# Patient Record
Sex: FEMALE | HISPANIC OR LATINO | Employment: FULL TIME | URBAN - METROPOLITAN AREA
[De-identification: names, ages, dates, MRNs, and addresses within clinical notes are randomized per-mention and may not be internally consistent; named-entity substitution may affect disease eponyms.]

---

## 2017-02-09 ENCOUNTER — OFFICE VISIT (OUTPATIENT)
Dept: URGENT CARE | Facility: URGENT CARE | Age: 32
End: 2017-02-09

## 2017-02-09 VITALS
DIASTOLIC BLOOD PRESSURE: 69 MMHG | OXYGEN SATURATION: 99 % | WEIGHT: 127 LBS | HEART RATE: 114 BPM | TEMPERATURE: 100.7 F | SYSTOLIC BLOOD PRESSURE: 108 MMHG

## 2017-02-09 DIAGNOSIS — R50.9 FEVER AND CHILLS: Primary | ICD-10-CM

## 2017-02-09 DIAGNOSIS — J06.9 VIRAL URI WITH COUGH: ICD-10-CM

## 2017-02-09 LAB
DEPRECATED S PYO AG THROAT QL EIA: NORMAL
FLUAV+FLUBV AG SPEC QL: NORMAL
FLUAV+FLUBV AG SPEC QL: NORMAL
MICRO REPORT STATUS: NORMAL
SPECIMEN SOURCE: NORMAL
SPECIMEN SOURCE: NORMAL

## 2017-02-09 PROCEDURE — 87880 STREP A ASSAY W/OPTIC: CPT | Performed by: PHYSICIAN ASSISTANT

## 2017-02-09 PROCEDURE — 87804 INFLUENZA ASSAY W/OPTIC: CPT | Performed by: PHYSICIAN ASSISTANT

## 2017-02-09 PROCEDURE — 87081 CULTURE SCREEN ONLY: CPT | Performed by: PHYSICIAN ASSISTANT

## 2017-02-09 PROCEDURE — 99203 OFFICE O/P NEW LOW 30 MIN: CPT | Performed by: FAMILY MEDICINE

## 2017-02-09 RX ORDER — CODEINE PHOSPHATE AND GUAIFENESIN 10; 100 MG/5ML; MG/5ML
1 SOLUTION ORAL EVERY 4 HOURS PRN
Qty: 120 ML | Refills: 0 | Status: SHIPPED | OUTPATIENT
Start: 2017-02-09 | End: 2018-10-23

## 2017-02-09 NOTE — Clinical Note
[facility]   [address] [place] [address]   [phone]                February 9, 2017    RE:  Louisa Peck                                                                                                                                                       [address] [address]            To whom it may concern:    Louisa Peck is under my professional care for current illness  Louisa  Will not be able to be at work tomorrow 2/10/2017 for current illness     Sincerely,  Gena Choudhary MD

## 2017-02-10 NOTE — PROGRESS NOTES
Chief Complaint   Patient presents with     Headache     headache, nausea, fever, bodyache, difficulty breathing, sore throat and dizziness since yesterday.      Letter for School/Work     request work note.      SUBJECTIVE:   Louisa Peck is a 31 year old female who present complaining of flu-like symptoms: fevers, chills, myalgias, congestion, sore throat and cough for 1 days. Denies dyspnea or wheezing.  She also has associated headache with dry coughing   She denies any sob or any dizziness symptoms       10 point ROS of systems including , Eyes,Cardiovascular, Gastroenterology, Genitourinary, Integumentary, Muscularskeletal, Psychiatric were all negative except for pertinent positives noted in my HPI             OBJECTIVE:  Appears moderately ill but not toxic; temperature as noted in vitals. Ears normal. Throat and pharynx normal.  Neck supple. No adenopathy in the neck. Sinuses non tender. The chest is clear.    ASSESSMENT:  Louisa was seen today for headache and letter for school/work.    Diagnoses and all orders for this visit:    Fever and chills  -     Rapid strep screen  -     Influenza A/B antigen  -     Beta strep group A culture    Viral URI with cough  -     guaiFENesin-codeine (ROBITUSSIN AC) 100-10 MG/5ML SOLN solution; Take 5 mLs by mouth every 4 hours as needed for cough    flu and strep negative     PLAN:  Symptomatic therapy suggested: increase fluids, gargle prn for sore throat, apply heat to sinuses prn, use mist of vaporizer prn, OTC acetaminophen and call prn if symptoms persist or worsen. Call or return to clinic prn if these symptoms worsen or fail to improve as anticipated.  Was given note for work excusing her from work tomorrow

## 2017-02-10 NOTE — NURSING NOTE
Chief Complaint   Patient presents with     Headache     headache, nausea, fever, bodyache, difficulty breathing, sore throat and dizziness since yesterday.      Letter for School/Work     request work note.      Initial /69 mmHg  Pulse 114  Temp(Src) 100.7  F (38.2  C) (Oral)  Wt 127 lb (57.607 kg)  SpO2 99% There is no height on file to calculate BMI..  bp completed using cuff size sunitha Wallace MA

## 2017-02-11 LAB
BACTERIA SPEC CULT: NORMAL
MICRO REPORT STATUS: NORMAL
SPECIMEN SOURCE: NORMAL

## 2018-10-23 ENCOUNTER — OFFICE VISIT (OUTPATIENT)
Dept: OBGYN | Facility: CLINIC | Age: 33
End: 2018-10-23
Payer: COMMERCIAL

## 2018-10-23 VITALS
BODY MASS INDEX: 26.31 KG/M2 | SYSTOLIC BLOOD PRESSURE: 108 MMHG | DIASTOLIC BLOOD PRESSURE: 76 MMHG | WEIGHT: 134 LBS | HEIGHT: 60 IN

## 2018-10-23 DIAGNOSIS — Z13.220 LIPID SCREENING: ICD-10-CM

## 2018-10-23 DIAGNOSIS — N97.9 INFERTILITY, FEMALE: ICD-10-CM

## 2018-10-23 DIAGNOSIS — N64.4 BREAST PAIN, RIGHT: ICD-10-CM

## 2018-10-23 DIAGNOSIS — Z01.419 WELL WOMAN EXAM WITH ROUTINE GYNECOLOGICAL EXAM: Primary | ICD-10-CM

## 2018-10-23 DIAGNOSIS — Z11.3 ROUTINE SCREENING FOR STI (SEXUALLY TRANSMITTED INFECTION): ICD-10-CM

## 2018-10-23 DIAGNOSIS — Z11.51 SCREENING FOR HUMAN PAPILLOMAVIRUS: ICD-10-CM

## 2018-10-23 DIAGNOSIS — Z12.4 CERVICAL CANCER SCREENING: ICD-10-CM

## 2018-10-23 DIAGNOSIS — G43.109 MIGRAINE WITH AURA AND WITHOUT STATUS MIGRAINOSUS, NOT INTRACTABLE: ICD-10-CM

## 2018-10-23 DIAGNOSIS — Z13.1 SCREENING FOR DIABETES MELLITUS: ICD-10-CM

## 2018-10-23 DIAGNOSIS — R12 HEARTBURN: ICD-10-CM

## 2018-10-23 PROCEDURE — 99214 OFFICE O/P EST MOD 30 MIN: CPT | Mod: 25 | Performed by: ADVANCED PRACTICE MIDWIFE

## 2018-10-23 PROCEDURE — G0145 SCR C/V CYTO,THINLAYER,RESCR: HCPCS | Performed by: ADVANCED PRACTICE MIDWIFE

## 2018-10-23 PROCEDURE — 87624 HPV HI-RISK TYP POOLED RSLT: CPT | Performed by: ADVANCED PRACTICE MIDWIFE

## 2018-10-23 PROCEDURE — 99385 PREV VISIT NEW AGE 18-39: CPT | Performed by: ADVANCED PRACTICE MIDWIFE

## 2018-10-23 PROCEDURE — 87491 CHLMYD TRACH DNA AMP PROBE: CPT | Performed by: ADVANCED PRACTICE MIDWIFE

## 2018-10-23 PROCEDURE — 87591 N.GONORRHOEAE DNA AMP PROB: CPT | Performed by: ADVANCED PRACTICE MIDWIFE

## 2018-10-23 NOTE — LETTER
November 1, 2018    Louisa Peck  [address]  [facility] [address]    Dear Louisa,  We are happy to inform you that your PAP smear result from 10/23/18 is normal.  We are now able to do a follow up test on PAP smears. The DNA test is for HPV (Human Papilloma Virus). Cervical cancer is closely linked with certain types of HPV. Your results showed no evidence of high risk HPV.  Therefore we recommend you return in 5 years for your next pap smear and HPV test.  You will still need to return to the clinic every year for an annual exam and other preventive tests.  If you have additional questions regarding this result, please call our registered nurse, Cindy at [phone].  Sincerely,    Bianca Omer CNM/hunter

## 2018-10-23 NOTE — MR AVS SNAPSHOT
After Visit Summary   10/23/2018    Louisa Peck    MRN: [id number]           Patient Information     Date Of Birth          1985        Visit Information        Provider Department      10/23/2018 3:00 PM Bianca Omer CNM [facility]        Today's Diagnoses     Infertility, female    -  1    Migraine with aura and without status migrainosus, not intractable        Heartburn        Routine screening for STI (sexually transmitted infection)        Screening for human papillomavirus        Cervical cancer screening        Lipid screening        Screening for diabetes mellitus          Care Instructions    Preconception Care    If you are thinking about becoming pregnant in the near future or actively trying to conceive we want to make sure you are as healthy as possible before becoming pregnant. By meeting with your midwife or provider prior to getting pregnant it allows us to address any health needs that can affect pregnancy. Please discuss your personal and family history with your midwife in order for us to identify any risk factors        Make sure all the medications you are taking are safe for pregnancy. This is especially important for women with chronic health conditions such as diabetes, seizure disorders, and/or hypertension. If you are unsure if your medications are safe we can discuss them with you, do not abruptly stop taking something if you've been prescribed a medication by a medical provider      Folic acid 400-800 mcg per day by mouth daily, begin this routine 1 to 12 months prior to planned conception, and continue through at least 13 weeks gestation. Some prenatal/multi-vitamins contain enough folic acid       Be up to date on all your vaccines. Avoid pregnancy for 1 month after administration of varicella/ Rubella (MMR) vaccines      We encourage all women to be at healthy BMI (<26) when attempting pregnancy, it is healthier for  both you and your baby. If you are overweight you can make a healthy choice by eating better and exercising more. Waiting to get pregnant at a healthy weight is an excellent choice.                                       Eat a healthy, well-balanced diet containing lots of fresh fruit and vegetables (frozen without added sugar is okay), protein, and healthy carbohydrates such as whole wheat breads and pastas      Exercise regularly. If you are wishing to get pregnant maintain normal exercise routine. If you don't exercise this is a great time for light activity daily such as walking/swimming      Limit caffeine intake to less than 200 mg per day, typically 1-2 cups of regular coffee is about 200 mg.       Avoid cigarettes, alcohol, and recreational drug use while attempting pregnancy. If you are actively trying to conceive but you get your period or a negative pregnancy test it is okay to have alcohol in moderation until you are actively trying again      If you have the potential to toxic chemical exposures in your work place or home please use special precautions                     Follow-ups after your visit        Additional Services     INFERTILITY/AI REFERRAL       Your provider has referred you to: [facility] [phone]   [email or web address]    Please be aware that coverage of these services is subject to the terms and limitations of your health insurance plan.  Call member services at your health plan with any benefit or coverage questions.      Please bring the following with you to your appointment:    (1) Any X-Rays, CTs or MRIs which have been performed.  Contact the facility where they were done to arrange for  prior to your scheduled appointment.    (2) List of current medications   (3) This referral request   (4) Any documents/labs given to you for this referral            INTERNAL MEDICINE REFERRAL       Your provider has referred you to: [facility]: Internal  "Medicine - Multiple locations [phone] [email or web address]    Please be aware that coverage of these services is subject to the terms and limitations of your health insurance plan.  Call member services at your health plan with any benefit or coverage questions.      Please bring the following to your appointment:  >>   Any x-rays, CTs or MRIs which have been performed.  Contact the facility where they were done to arrange for  prior to your scheduled appointment.   >>   List of current medications   >>   This referral request   >>   Any documents/labs given to you for this referral                  Future tests that were ordered for you today     Open Future Orders        Priority Expected Expires Ordered    Lipid Profile (Chol, Trig, HDL, LDL calc) Routine  10/23/2019 10/23/2018    Glucose Routine  10/23/2019 10/23/2018            Who to contact     If you have questions or need follow up information about today's clinic visit or your schedule please contact [facility] directly at [phone].  Normal or non-critical lab and imaging results will be communicated to you by MyChart, letter or phone within 4 business days after the clinic has received the results. If you do not hear from us within 7 days, please contact the clinic through [employer] or phone. If you have a critical or abnormal lab result, we will notify you by phone as soon as possible.  Submit refill requests through [email or web address] or call your pharmacy and they will forward the refill request to us. Please allow 3 business days for your refill to be completed.          Additional Information About Your Visit        [employer] Information     [email or web address] lets you send messages to your doctor, view your test results, renew your prescriptions, schedule appointments and more. To sign up, go to [email or web address] . Click on \"Log in\" on the left side of the screen, which will take you to " "the Welcome page. Then click on \"Sign up Now\" on the right side of the page.     You will be asked to enter the access code listed below, as well as some personal information. Please follow the directions to create your username and password.     Your access code is: [email or web address]  Expires: 2019  3:59 PM     Your access code will  in 90 days. If you need help or a new code, please call your [facility] clinic or [phone].        Care EveryWhere ID     This is your Care EveryWhere ID. This could be used by other organizations to access your [facility] medical records  [id number]        Your Vitals Were     Height Last Period Breastfeeding? BMI (Body Mass Index)          5' (1.524 m) 10/16/2018 (Exact Date) No 26.17 kg/m2         Blood Pressure from Last 3 Encounters:   10/23/18 108/76   17 108/69    Weight from Last 3 Encounters:   10/23/18 134 lb (60.8 kg)   17 127 lb (57.6 kg)              We Performed the Following     Chlamydia trachomatis PCR     HPV High Risk Types DNA Cervical     INFERTILITY/AI REFERRAL     INTERNAL MEDICINE REFERRAL     Neisseria gonorrhoeae PCR     PAP imaged thin layer screen        Primary Care Provider Office Phone # Fax #    Bianca Rogers [place], [facility] [phone]       [address] [facility] [address]        Equal Access to Services     [facility]: Hadii aad ku hadasho Soomaali, waaxda luqadaha, qaybta kaalmada adeegyada, amando godoy hayjean marie lima . So [facility] [phone].    ATENCIÓN: Si habla español, tiene a alcaraz disposición servicios gratuitos de asistencia lingüística. Llame al [phone].    We comply with applicable federal civil rights laws and [place] laws. We do not discriminate on the basis of race, color, national origin, age, disability, sex, sexual orientation, or gender identity.            Thank you!     Thank you for choosing [facility]  for your care. Our goal is always to provide " you with excellent care. Hearing back from our patients is one way we can continue to improve our services. Please take a few minutes to complete the written survey that you may receive in the mail after your visit with us. Thank you!             Your Updated Medication List - Protect others around you: Learn how to safely use, store and throw away your medicines at www.disposemymeds.org.      Notice  As of 10/23/2018  3:59 PM    You have not been prescribed any medications.

## 2018-10-23 NOTE — PATIENT INSTRUCTIONS
Preconception Care    If you are thinking about becoming pregnant in the near future or actively trying to conceive we want to make sure you are as healthy as possible before becoming pregnant. By meeting with your midwife or provider prior to getting pregnant it allows us to address any health needs that can affect pregnancy. Please discuss your personal and family history with your midwife in order for us to identify any risk factors        Make sure all the medications you are taking are safe for pregnancy. This is especially important for women with chronic health conditions such as diabetes, seizure disorders, and/or hypertension. If you are unsure if your medications are safe we can discuss them with you, do not abruptly stop taking something if you've been prescribed a medication by a medical provider      Folic acid 400-800 mcg per day by mouth daily, begin this routine 1 to 12 months prior to planned conception, and continue through at least 13 weeks gestation. Some prenatal/multi-vitamins contain enough folic acid       Be up to date on all your vaccines. Avoid pregnancy for 1 month after administration of varicella/ Rubella (MMR) vaccines      We encourage all women to be at healthy BMI (<26) when attempting pregnancy, it is healthier for both you and your baby. If you are overweight you can make a healthy choice by eating better and exercising more. Waiting to get pregnant at a healthy weight is an excellent choice.                                       Eat a healthy, well-balanced diet containing lots of fresh fruit and vegetables (frozen without added sugar is okay), protein, and healthy carbohydrates such as whole wheat breads and pastas      Exercise regularly. If you are wishing to get pregnant maintain normal exercise routine. If you don't exercise this is a great time for light activity daily such as walking/swimming      Limit caffeine intake to less than 200 mg per day, typically 1-2 cups of  regular coffee is about 200 mg.       Avoid cigarettes, alcohol, and recreational drug use while attempting pregnancy. If you are actively trying to conceive but you get your period or a negative pregnancy test it is okay to have alcohol in moderation until you are actively trying again      If you have the potential to toxic chemical exposures in your work place or home please use special precautions

## 2018-10-23 NOTE — PROGRESS NOTES
"Louisa is a 33 year old  female who presents for annual exam.     Besides routine health maintenance,  she would like to discuss heavy periods with cramps and migraines. She would also like to discuss a history of gastritis and heartburn which has not been evaluated for many years. She also reports breast pain. Lastly she would like to discuss becoming pregnant in the next year.  HPI:    Periods & Migraines: Patient reports for several years she has been having problems with periods. States she gets a headache 5 days prior to period, it lasts through the period, and for 2 days after. States she does sometime have flashing lights and vision changes, possible aura. States she tries Tylenol and ibuprofen but it doesn't help much. Periods are also very heavy and she has strong cramping for the first 2 days.   Heartburn: States she has a long term history of heartburn and \"gastritis\" which hasn't been evaluated for a long time. She was told by a provider at one point that she should not take ibuprofen. She is not using anything for the heartburn.   Breast pain: Patient reports right breast pain x 1 year. She has not noticed any change in appearance, no lumps/masses, no discharge. No left breast pain.   Preconception: Patient reports she is  to a woman and is planning to conceive within the next year. She is requesting information and a referral to help with IUI. Patient is planning to use donor sperm and she will be carrying the pregnancy. Has not started any prenatal vitamins yet.   Menses are regular q 28-30 days and crampy and heavy lasting 7 days.   Menses flow: normal and heavy with clots on the first 2 days.    Patient's last menstrual period was 10/16/2018 (exact date)..   Does not need contraception  She is currently considering pregnancy.    REPRODUCTIVE/GYNECOLOGIC HISTORY:  Louisa is sexually active with female partner(s) and is currently in monogamous relationship.   STI testing offered?  " Accepted  History of abnormal Pap smear:  No  SOCIAL HISTORY  Abuse: does not report having previously been physical or sexually abused.    Do you feel safe in your environment? YES    She  reports that she has never smoked. She has never used smokeless tobacco.          HEALTH MAINTENANCE:  Cholesterol: (No results found for: CHOL History of abnormal lipids: No  Mammo: N/A . History of abnormal Mammo: Not applicable.  Regular Self Breast Exams: Yes  TSH: (No results found for: TSH )  Pap; (No results found for: PAP )  Immunizations up to date: yes  (Gardasil, Tdap, Flu)  Health maintenance updated:  no    HEALTHY HABITS  Eating habits: eats regular meals  Calcium/Vitamin D intake: source:  dairy Adequate?   Exercise: How often do you exercise? Daily;Walking  Have you had an eye exam in the last two years? NO (Recommended)  Do you routinely see the dentist once or twice yearly? NO (Recommended)      HISTORY:  Obstetric History       T0      L0     SAB0   TAB0   Ectopic0   Multiple0   Live Births0         History reviewed. No pertinent past medical history.  Past Surgical History:   Procedure Laterality Date     ARTHROSCOPY KNEE WITH MEDIAL MENISCECTOMY Left      REMV/REVISN BOOT/BODY CAST Left      Family History   Problem Relation Age of Onset     Diabetes Mother      Diabetes Father      Social History     Social History     Marital status:      Spouse name: N/A     Number of children: N/A     Years of education: N/A     Social History Main Topics     Smoking status: Never Smoker     Smokeless tobacco: Never Used     Alcohol use 0.0 oz/week     0 Standard drinks or equivalent per week      Comment: Rare     Drug use: No     Sexual activity: Yes     Partners: Female     Other Topics Concern     None     Social History Narrative     No current outpatient prescriptions on file.   No Known Allergies    Past medical, surgical, social and family history were reviewed and updated in EPIC.    ROS:    12 point review of systems negative other than symptoms noted below.  Gastrointestinal: Heartburn    PHYSICAL EXAM:  /76 (BP Location: Left arm, Patient Position: Sitting, Cuff Size: Adult Regular)  Ht 5' (1.524 m)  Wt 134 lb (60.8 kg)  LMP 10/16/2018 (Exact Date)  Breastfeeding? No  BMI 26.17 kg/m2   BMI: Body mass index is 26.17 kg/(m^2).  Constitutional: healthy, alert and no distress  Neck: symmetrical, thyroid normal size, no masses present, no lymphadenopathy present.   Cardiovascular: RRR, no murmurs present  Respiratory: breathing unlabored, lungs CTA bilaterally  Breast: Right breast 2 cm x 2 cm firm nodule palpated below the clavicle, non-tender, movable. No other masses, discharge, or skin changes noted.   Left normal without masses, tenderness or nipple discharge and no palpable axillary masses or adenopathy  Gastrointestinal: abdomen soft, non-tender, bowel sounds present  PELVIC EXAM:  Vulva: No lesions, no adenopathy, BUS WNL  Vagina: Moist, pink, discharge normal  well rugated, no lesions  Cervix:smooth, pink, no visible lesions  Uterus: Normal size, non-tender, mobile  Ovaries: No masses palpated  Rectal exam: deferred    ASSESSMENT/PLAN:    ICD-10-CM    1. Well woman exam with routine gynecological exam Z01.419    2. Infertility, female N97.9 INFERTILITY/AI REFERRAL   3. Migraine with aura and without status migrainosus, not intractable G43.109 INTERNAL MEDICINE REFERRAL   4. Heartburn R12 INTERNAL MEDICINE REFERRAL   5. Routine screening for STI (sexually transmitted infection) Z11.3 Neisseria gonorrhoeae PCR     Chlamydia trachomatis PCR   6. Screening for human papillomavirus Z11.51 HPV High Risk Types DNA Cervical   7. Cervical cancer screening Z12.4 CANCELED: PAP imaged thin layer screen   8. Lipid screening Z13.220 Lipid Profile (Chol, Trig, HDL, LDL calc)   9. Screening for diabetes mellitus Z13.1 Glucose   10. Breast pain, right N64.4 MA Diagnostic Digital Bilateral      Discussed options to control periods including progesterone only contraceptive options. Patient not a candidate for estrogen due to migraines with aura. Patient planning to conceive and declines POPs. Referred to internal medicine for evaluation and treatment of migraines. Discussed option of NSAIDs to control bleeding and cramping but due to GI history, recommend internal medicine evaluation prior to beginning NSAID treatment.     Recommended Tums over the counter for heartburn and referral to IM for full evaluation of GI symptoms    Pap and HPV, if negative plan 5 year co-testing    Labs pending, will follow up with results    Referral to infertility made, for evaluation and possible IUI    Discussed preconception-  Including folic acid, vaccines up to date, healthy diet, and exercise.     Diagnostic mammogram and ultrasound of the right breast ordered. Follow up with results      COUNSELING:   Reviewed preventive health counseling, as reflected in patient instructions  Special attention given to:        Regular exercise       Healthy diet/nutrition       Vision screening       Hearing screening       Family planning   reports that she has never smoked. She has never used smokeless tobacco.      Bianca Omer CNM, ELOINA-BC

## 2018-10-23 NOTE — NURSING NOTE
Chief Complaint   Patient presents with     Gyn Exam     Annual with pap      Abnormal Bleeding Problem     Heavy periods        Initial /76 (BP Location: Left arm, Patient Position: Sitting, Cuff Size: Adult Regular)  Ht 5' (1.524 m)  Wt 134 lb (60.8 kg)  LMP 10/16/2018 (Exact Date)  Breastfeeding? No  BMI 26.17 kg/m2 Estimated body mass index is 26.17 kg/(m^2) as calculated from the following:    Height as of this encounter: 5' (1.524 m).    Weight as of this encounter: 134 lb (60.8 kg).  BP completed using cuff size: regular    Questioned patient about current smoking habits.  Pt. has never smoked.          The following HM Due: pap smear    patient has appointment for today.  Ousmane Brown CMA

## 2018-10-25 LAB
C TRACH DNA SPEC QL NAA+PROBE: NEGATIVE
COPATH REPORT: NORMAL
N GONORRHOEA DNA SPEC QL NAA+PROBE: NEGATIVE
PAP: NORMAL
SPECIMEN SOURCE: NORMAL
SPECIMEN SOURCE: NORMAL

## 2018-10-26 LAB
FINAL DIAGNOSIS: NORMAL
HPV HR 12 DNA CVX QL NAA+PROBE: NEGATIVE
HPV16 DNA SPEC QL NAA+PROBE: NEGATIVE
HPV18 DNA SPEC QL NAA+PROBE: NEGATIVE
SPECIMEN DESCRIPTION: NORMAL
SPECIMEN SOURCE CVX/VAG CYTO: NORMAL

## 2019-05-09 ENCOUNTER — OFFICE VISIT (OUTPATIENT)
Dept: URGENT CARE | Facility: URGENT CARE | Age: 34
End: 2019-05-09
Payer: COMMERCIAL

## 2019-05-09 VITALS
OXYGEN SATURATION: 99 % | SYSTOLIC BLOOD PRESSURE: 104 MMHG | TEMPERATURE: 98.4 F | HEART RATE: 94 BPM | BODY MASS INDEX: 26.83 KG/M2 | WEIGHT: 137.4 LBS | DIASTOLIC BLOOD PRESSURE: 64 MMHG

## 2019-05-09 DIAGNOSIS — D64.9 ANEMIA, UNSPECIFIED TYPE: ICD-10-CM

## 2019-05-09 DIAGNOSIS — E07.89 PAINFUL THYROID: ICD-10-CM

## 2019-05-09 DIAGNOSIS — M54.2 NECK PAIN: Primary | ICD-10-CM

## 2019-05-09 LAB
BASOPHILS # BLD AUTO: 0 10E9/L (ref 0–0.2)
BASOPHILS NFR BLD AUTO: 0.4 %
DIFFERENTIAL METHOD BLD: ABNORMAL
EOSINOPHIL # BLD AUTO: 0.2 10E9/L (ref 0–0.7)
EOSINOPHIL NFR BLD AUTO: 1.6 %
ERYTHROCYTE [DISTWIDTH] IN BLOOD BY AUTOMATED COUNT: 14.8 % (ref 10–15)
ERYTHROCYTE [SEDIMENTATION RATE] IN BLOOD BY WESTERGREN METHOD: 25 MM/H (ref 0–20)
HCT VFR BLD AUTO: 34 % (ref 35–47)
HGB BLD-MCNC: 11.1 G/DL (ref 11.7–15.7)
LYMPHOCYTES # BLD AUTO: 3 10E9/L (ref 0.8–5.3)
LYMPHOCYTES NFR BLD AUTO: 29.7 %
MCH RBC QN AUTO: 25.9 PG (ref 26.5–33)
MCHC RBC AUTO-ENTMCNC: 32.6 G/DL (ref 31.5–36.5)
MCV RBC AUTO: 79 FL (ref 78–100)
MONOCYTES # BLD AUTO: 1.1 10E9/L (ref 0–1.3)
MONOCYTES NFR BLD AUTO: 10.8 %
NEUTROPHILS # BLD AUTO: 5.8 10E9/L (ref 1.6–8.3)
NEUTROPHILS NFR BLD AUTO: 57.5 %
PLATELET # BLD AUTO: 248 10E9/L (ref 150–450)
RBC # BLD AUTO: 4.29 10E12/L (ref 3.8–5.2)
TSH SERPL DL<=0.005 MIU/L-ACNC: 1.58 MU/L (ref 0.4–4)
WBC # BLD AUTO: 10.2 10E9/L (ref 4–11)

## 2019-05-09 PROCEDURE — 85652 RBC SED RATE AUTOMATED: CPT | Performed by: PHYSICIAN ASSISTANT

## 2019-05-09 PROCEDURE — 85025 COMPLETE CBC W/AUTO DIFF WBC: CPT | Performed by: PHYSICIAN ASSISTANT

## 2019-05-09 PROCEDURE — 36415 COLL VENOUS BLD VENIPUNCTURE: CPT | Performed by: PHYSICIAN ASSISTANT

## 2019-05-09 PROCEDURE — 84443 ASSAY THYROID STIM HORMONE: CPT | Performed by: PHYSICIAN ASSISTANT

## 2019-05-09 PROCEDURE — 99214 OFFICE O/P EST MOD 30 MIN: CPT | Performed by: PHYSICIAN ASSISTANT

## 2019-05-09 RX ORDER — IBUPROFEN 800 MG/1
800 TABLET, FILM COATED ORAL EVERY 8 HOURS PRN
Qty: 30 TABLET | Refills: 0 | Status: SHIPPED | OUTPATIENT
Start: 2019-05-09 | End: 2019-06-13

## 2019-05-09 RX ORDER — CEPHALEXIN 500 MG/1
500 CAPSULE ORAL 3 TIMES DAILY
Qty: 30 CAPSULE | Refills: 0 | Status: SHIPPED | OUTPATIENT
Start: 2019-05-09 | End: 2019-05-10

## 2019-05-10 ENCOUNTER — OFFICE VISIT (OUTPATIENT)
Dept: INTERNAL MEDICINE | Facility: CLINIC | Age: 34
End: 2019-05-10
Payer: COMMERCIAL

## 2019-05-10 VITALS
TEMPERATURE: 98.3 F | WEIGHT: 140 LBS | HEART RATE: 99 BPM | SYSTOLIC BLOOD PRESSURE: 100 MMHG | OXYGEN SATURATION: 100 % | BODY MASS INDEX: 27.34 KG/M2 | DIASTOLIC BLOOD PRESSURE: 70 MMHG | RESPIRATION RATE: 16 BRPM

## 2019-05-10 DIAGNOSIS — E07.9 SWELLING OF THYROID GLAND: ICD-10-CM

## 2019-05-10 DIAGNOSIS — E07.89 PAINFUL THYROID: Primary | ICD-10-CM

## 2019-05-10 PROCEDURE — 99214 OFFICE O/P EST MOD 30 MIN: CPT | Performed by: PHYSICIAN ASSISTANT

## 2019-05-10 NOTE — PROGRESS NOTES
SUBJECTIVE:  Louisa Peck is a 33 year old female with a chief complaint of sore throat.  Onset of symptoms was 3 day(s) ago.    Course of illness: gradual onset, still present and worsening.  Severity moderate  Current and Associated symptoms: left thyroid tenderness, localized swelling  Treatment measures tried include neck pain, swelling.  Predisposing factors include none.    PMH  Overweight    ALLERGIES  No Known Allergies     Social History     Tobacco Use     Smoking status: Never Smoker     Smokeless tobacco: Never Used   Substance Use Topics     Alcohol use: Yes     Alcohol/week: 0.0 oz     Comment: Rare     Family History  Obesity     ROS:  CONSTITUTIONAL:NEGATIVE for fever, chills, change in weight  INTEGUMENTARY/SKIN: POSITIVE for mild swelling left side throat, neck  EYES: NEGATIVE for vision changes or irritation  ENT/MOUTH: POSITIVE for left side lower neck tenderness, swelling and pain  RESP:NEGATIVE for significant cough or SOB  CV: NEGATIVE for chest pain, palpitations or peripheral edema  GI: NEGATIVE for nausea, abdominal pain, heartburn, or change in bowel habits  : negative for pregnancy  MUSCULOSKELETAL: NEGATIVE for significant arthralgias or myalgia  NEURO: NEGATIVE for weakness, dizziness or paresthesias    OBJECTIVE:   /64   Pulse 94   Temp 98.4  F (36.9  C) (Oral)   Wt 62.3 kg (137 lb 6.4 oz)   SpO2 99%   Breastfeeding? No   BMI 26.83 kg/m    GENERAL APPEARANCE: healthy, alert and no distress  HENT: ear canals and TM's normal.  Nose normal.  Pharynx erythematous with some exudate noted.  NECK: Positive for tender, swelling nodular area off left side neck  RESP: lungs clear to auscultation - no rales, rhonchi or wheezes  CV: regular rates and rhythm, normal S1 S2, no murmur noted  ABDOMEN:  soft, nontender, no HSM or masses and bowel sounds normal  SKIN: Positive for mild swelling , localized swelling left side neck    Results for orders placed or performed in  visit on 05/09/19   CBC with platelets differential   Result Value Ref Range    WBC 10.2 4.0 - 11.0 10e9/L    RBC Count 4.29 3.8 - 5.2 10e12/L    Hemoglobin 11.1 (L) 11.7 - 15.7 g/dL    Hematocrit 34.0 (L) 35.0 - 47.0 %    MCV 79 78 - 100 fl    MCH 25.9 (L) 26.5 - 33.0 pg    MCHC 32.6 31.5 - 36.5 g/dL    RDW 14.8 10.0 - 15.0 %    Platelet Count 248 150 - 450 10e9/L    % Neutrophils 57.5 %    % Lymphocytes 29.7 %    % Monocytes 10.8 %    % Eosinophils 1.6 %    % Basophils 0.4 %    Absolute Neutrophil 5.8 1.6 - 8.3 10e9/L    Absolute Lymphocytes 3.0 0.8 - 5.3 10e9/L    Absolute Monocytes 1.1 0.0 - 1.3 10e9/L    Absolute Eosinophils 0.2 0.0 - 0.7 10e9/L    Absolute Basophils 0.0 0.0 - 0.2 10e9/L    Diff Method Automated Method    TSH with free T4 reflex   Result Value Ref Range    TSH 1.58 0.40 - 4.00 mU/L   Erythrocyte sedimentation rate auto   Result Value Ref Range    Sed Rate 25 (H) 0 - 20 mm/h         ASSESSMENT/PLAN      ICD-10-CM    1. Neck pain M54.2 CBC with platelets differential     TSH with free T4 reflex     Erythrocyte sedimentation rate auto     ibuprofen (ADVIL/MOTRIN) 800 MG tablet   2. Painful thyroid E07.89 CBC with platelets differential     TSH with free T4 reflex     Erythrocyte sedimentation rate auto     ibuprofen (ADVIL/MOTRIN) 800 MG tablet     cephALEXin (KEFLEX) 500 MG capsule   3. Anemia, unspecified type D64.9           Orders Placed This Encounter     CBC with platelets differential     TSH with free T4 reflex     Erythrocyte sedimentation rate auto     ibuprofen (ADVIL/MOTRIN) 800 MG tablet     cephALEXin (KEFLEX) 500 MG capsule       Motrin for swelling and tenderness  Keflex to cover for any secondary infections, although unlikely  Advised to follow up with PCP tomorrow for evaluation, she may need ultrasound of neck, possible needle biopsy  Advised to go to the ED if symptoms worsen

## 2019-05-10 NOTE — PROGRESS NOTES
SUBJECTIVE:   Louisa Peck is a 33 year old female who presents to clinic today for the following   health issues:        ED/UC Followup:    Facility:  [facility]  Date of visit: 5/9/19  Reason for visit: Mass on neck  Current Status: Pt states that it is still painful and hurts when swallowing     Seen in UC yesterday dx with thyroid gland pain/swelling thyroiditis, treatment with Motrin which helps the pain and swelling.  Also started on keflex ?   Labs done normal TSH no elevated WBC count and Sed rate 25. - not markedly elevated  No person or family hx of thyroid disorder.          -------------------------------------    Additional history: as documented    Reviewed  and updated as needed this visit by clinical staff         Reviewed and updated as needed this visit by Provider  Allergies  Meds         Labs reviewed in EPIC    ROS:  Constitutional, HEENT, cardiovascular, pulmonary, gi and gu systems are negative, except as otherwise noted.    OBJECTIVE:     /70 (BP Location: Left arm, Patient Position: Chair, Cuff Size: Adult Regular)   Pulse 99   Temp 98.3  F (36.8  C) (Oral)   Resp 16   Wt 63.5 kg (140 lb)   LMP 05/10/2019   SpO2 100%   BMI 27.34 kg/m    Body mass index is 27.34 kg/m .  GENERAL: healthy, alert and no distress  HENT: normal cephalic/atraumatic, ear canals and TM's normal, nose and mouth without ulcers or lesions, oropharynx clear and oral mucous membranes moist  NECK: no adenopathy and thyroid swelling on the left noted, tender  CV: regular rate and rhythm, normal S1 S2, no S3 or S4, no murmur, click or rub, no peripheral edema and peripheral pulses strong  MS: no gross musculoskeletal defects noted, no edema  SKIN: no suspicious lesions or rashes    Diagnostic Test Results:  Pending     ASSESSMENT/PLAN:             1. Painful thyroid    - US Thyroid; Future    2. Swelling of thyroid gland    - US Thyroid; Future    Patient Instructions   Icing,  Continue the  Motrin  Ok to stop keflex     Ultrasound next week  Then follow up with new primary provider.        Bonny Aguilar PA-C  [facility]

## 2019-05-10 NOTE — PATIENT INSTRUCTIONS
Icing,  Continue the Motrin  Ok to stop keflex     Ultrasound next week  Then follow up with new primary provider.

## 2019-05-14 ENCOUNTER — HOSPITAL ENCOUNTER (OUTPATIENT)
Dept: MAMMOGRAPHY | Facility: CLINIC | Age: 34
Discharge: HOME | End: 2019-05-14
Attending: ADVANCED PRACTICE MIDWIFE | Admitting: ADVANCED PRACTICE MIDWIFE
Payer: COMMERCIAL

## 2019-05-14 ENCOUNTER — HOSPITAL ENCOUNTER (OUTPATIENT)
Dept: ULTRASOUND IMAGING | Facility: CLINIC | Age: 34
End: 2019-05-14
Attending: PHYSICIAN ASSISTANT
Payer: COMMERCIAL

## 2019-05-14 ENCOUNTER — HOSPITAL ENCOUNTER (OUTPATIENT)
Dept: MAMMOGRAPHY | Facility: CLINIC | Age: 34
End: 2019-05-14
Attending: ADVANCED PRACTICE MIDWIFE
Payer: COMMERCIAL

## 2019-05-14 DIAGNOSIS — N64.4 BREAST PAIN, RIGHT: ICD-10-CM

## 2019-05-14 DIAGNOSIS — E07.9 SWELLING OF THYROID GLAND: ICD-10-CM

## 2019-05-14 DIAGNOSIS — E07.89 PAINFUL THYROID: ICD-10-CM

## 2019-05-14 PROCEDURE — 77066 DX MAMMO INCL CAD BI: CPT

## 2019-05-14 PROCEDURE — 76642 ULTRASOUND BREAST LIMITED: CPT | Mod: 50

## 2019-05-14 PROCEDURE — 76536 US EXAM OF HEAD AND NECK: CPT

## 2019-05-14 PROCEDURE — G0279 TOMOSYNTHESIS, MAMMO: HCPCS

## 2019-05-15 ENCOUNTER — TELEPHONE (OUTPATIENT)
Dept: INTERNAL MEDICINE | Facility: CLINIC | Age: 34
End: 2019-05-15

## 2019-05-15 DIAGNOSIS — E04.1 THYROID CYST: ICD-10-CM

## 2019-05-15 DIAGNOSIS — N60.01 BREAST CYST, RIGHT: Primary | ICD-10-CM

## 2019-05-15 DIAGNOSIS — E04.1 THYROID NODULE: Primary | ICD-10-CM

## 2019-05-15 NOTE — TELEPHONE ENCOUNTER
Patient informed of message below and given referral information.  Christina Harris, CMA on 5/15/2019 at 10:26 AM

## 2019-05-15 NOTE — TELEPHONE ENCOUNTER
Ultrasound of the thyroid shows a cyst and a nodule on the left side.  Recommendation to see endocrinology for further management of this. - Possible biopsy .

## 2019-05-20 ENCOUNTER — OFFICE VISIT (OUTPATIENT)
Dept: INTERNAL MEDICINE | Facility: CLINIC | Age: 34
End: 2019-05-20
Payer: COMMERCIAL

## 2019-05-20 VITALS
RESPIRATION RATE: 16 BRPM | HEART RATE: 84 BPM | WEIGHT: 138 LBS | BODY MASS INDEX: 27.09 KG/M2 | DIASTOLIC BLOOD PRESSURE: 68 MMHG | SYSTOLIC BLOOD PRESSURE: 108 MMHG | TEMPERATURE: 98.9 F | HEIGHT: 60 IN | OXYGEN SATURATION: 100 %

## 2019-05-20 DIAGNOSIS — J31.0 RHINITIS, UNSPECIFIED TYPE: ICD-10-CM

## 2019-05-20 DIAGNOSIS — E04.1 THYROID NODULE: Primary | ICD-10-CM

## 2019-05-20 PROCEDURE — 99214 OFFICE O/P EST MOD 30 MIN: CPT | Performed by: INTERNAL MEDICINE

## 2019-05-20 ASSESSMENT — MIFFLIN-ST. JEOR: SCORE: 1242.46

## 2019-05-20 NOTE — PROGRESS NOTES
"Subjective     Louisa Peck is a 33 year old female who presents to clinic today for the following health issues:    HPI   Chief Complaint   Patient presents with     Establish Care     Was under th care of Dr. Bianca Omer in [place]     Follow Up     for O.V with Bonny Aguilar on the 5/10/2019      Results     for U/S thyroid on 05/14/2019       Pt's past medical history, family history, habits, medications and allergies were reviewed with the patient today.   Most recent lab results reviewed with pt. Problem list and histories reviewed & adjusted, as indicated.  Additional history as below:    Meeting patient for the first time today.  Recently found to have a left thyroid nodule and underwent a thyroid ultrasound showing a 3.8cm nodule as below:\"    ULTRASOUND THYROID     PROCEDURE DATE:  5/14/2019 5:02 PM      COMPARISON:   None.     HISTORY:   33-year-old female with painful swollen thyroid gland.     TECHNIQUE:  Grayscale and color doppler ultrasound of the thyroid gland was  performed     FINDINGS:   The right lobe measures 5.1 x 1.5 x 1.9 cm. The left lobe measures 5.2  x 1.6 x 3.0 cm. The isthmus is normal in thickness. Thyroid parenchyma  is homogenous in echotexture.     Thyroid nodules as follows:   1.  Within the inferior left lobe a 3.8 x 3.4 x 2.7 cm complex cystic  and solid nodule is present. No internal calcifications.                                                                    IMPRESSION:   3.8 cm left inferior lobe nodule. Fine-needle aspiration could be  performed..     MARY ANN SERRA MD       Component      Latest Ref Rng & Units 5/9/2019   TSH      0.40 - 4.00 mU/L 1.58       Patient has some clear postnasal drainage occasionally which causes a nonproductive cough.  Mild sensation of swallowing difficulties at times with large thyroid nodule.  Denies any stridor.  No shortness of breath.  No neck pain.  Thyroid function normal as above.  Review the chart shows patient has " an appointment with [place] [facility]  in mid June    Additional ROS:   Constitutional, HEENT, Cardiovascular, Pulmonary, GI and , Neuro, MSK and Psych review of systems/symptoms are otherwise negative or unchanged from previous, except as noted above.      OBJECTIVE:  /68   Pulse 84   Temp 98.9  F (37.2  C) (Oral)   Resp 16   Ht 1.524 m (5')   Wt 62.6 kg (138 lb)   LMP 05/10/2019   SpO2 100%   BMI 26.95 kg/m     Estimated body mass index is 26.95 kg/m  as calculated from the following:    Height as of this encounter: 1.524 m (5').    Weight as of this encounter: 62.6 kg (138 lb).     HENT: ear canals and TM's normal and nose and mouth without ulcers or lesions. nasal mucosa mildly edematous.  Clear nasal discharge.  Sinuses nontender palpation.  Frequent sniffles and occasional cough during exam today  Neck: no adenopathy.  Nontender,  3.8 cm thyroid nodule palpable  Easily left side. No bruits  Pulm: Lungs clear to auscultation   CV: Regular rates and rhythm  GI: Soft, nontender, Normal active bowel sounds, No hepatosplenomegaly or masses palpable  Ext: Peripheral pulses intact. No edema.  Neuro: Normal strength and tone, sensory exam grossly normal    Assessment/Plan: (See plan discussion below for further details)  1. Thyroid nodule  Will need thyroid tissue biopsy to rule out thyroid cancer versus benign nodular growth.  Overall thyroid function normal.  Will obtain an FNA  - US Biopsy Thyroid Fine Needle Aspiration; Future    2. Rhinitis, unspecified type  Plan.  Vasomotor versus allergen related.  Patient to try using over-the-counter antihistamine therapy with Claritin/loratadine.  If not improving, patient may instead try over-the-counter Flonase steroid nasal spray.    Plan discussion:   Thyroid nodule biopsy at [facility], They will call to schedule or call [phone]  Hold Ibuprofen 3 days prior to the procedure   Keep appt with Dr Meredith in June to discuss treatment options.  Patient  does have some mild dysphasia symptoms with the nodular size so, even if tissue found to be benign, may need to consider referral to surgery to discuss parathyroidectomy versus other.  Nodule likely too large to hope to shrink very much if medication use to put thyroid function into the upper range of normal  For post nasal drainage., may try some Loratidine/Claritin 10mg tab (OTC), 1 tab daily as needed to see if allergy related cough/drainage       Krishna Gordon MD  Internal Medicine Department  [facility]    (Chart documentation was completed, in part, with [employer] voice-recognition software. Even though reviewed, some grammatical, spelling, and word errors may remain.)

## 2019-05-20 NOTE — PATIENT INSTRUCTIONS
Thyroid nodule biopsy at [facility], They will call to schedule or call [phone]  Hold Ibuprofen 3 days prior to the procedure   Keep appt with Dr Meredith in June to discuss treatment options  For post nasal drainage., may try some Loratidine/Claritin 10mg tab (OPTC), 1 tab daily as needed to se if allergy related

## 2019-05-28 ENCOUNTER — HOSPITAL ENCOUNTER (OUTPATIENT)
Facility: CLINIC | Age: 34
Discharge: HOME | End: 2019-05-28
Admitting: INTERNAL MEDICINE
Payer: COMMERCIAL

## 2019-05-28 ENCOUNTER — HOSPITAL ENCOUNTER (OUTPATIENT)
Dept: ULTRASOUND IMAGING | Facility: CLINIC | Age: 34
End: 2019-05-28
Attending: INTERNAL MEDICINE
Payer: COMMERCIAL

## 2019-05-28 VITALS
DIASTOLIC BLOOD PRESSURE: 69 MMHG | SYSTOLIC BLOOD PRESSURE: 119 MMHG | OXYGEN SATURATION: 100 % | HEIGHT: 60 IN | BODY MASS INDEX: 26.7 KG/M2 | TEMPERATURE: 98.7 F | RESPIRATION RATE: 16 BRPM | WEIGHT: 136 LBS | HEART RATE: 78 BPM

## 2019-05-28 DIAGNOSIS — E04.1 THYROID NODULE: ICD-10-CM

## 2019-05-28 PROCEDURE — 88173 CYTOPATH EVAL FNA REPORT: CPT

## 2019-05-28 PROCEDURE — 88173 CYTOPATH EVAL FNA REPORT: CPT | Mod: 26

## 2019-05-28 PROCEDURE — 25000125 ZZHC RX 250: Performed by: INTERNAL MEDICINE

## 2019-05-28 PROCEDURE — 40000863 ZZH STATISTIC RADIOLOGY XRAY, US, CT, MAR, NM

## 2019-05-28 PROCEDURE — 10005 FNA BX W/US GDN 1ST LES: CPT

## 2019-05-28 RX ORDER — LIDOCAINE HYDROCHLORIDE 10 MG/ML
10 INJECTION, SOLUTION EPIDURAL; INFILTRATION; INTRACAUDAL; PERINEURAL ONCE
Status: COMPLETED | OUTPATIENT
Start: 2019-05-28 | End: 2019-05-28

## 2019-05-28 RX ORDER — DEXTROSE MONOHYDRATE 25 G/50ML
25-50 INJECTION, SOLUTION INTRAVENOUS
Status: CANCELLED | OUTPATIENT
Start: 2019-05-28

## 2019-05-28 RX ORDER — NICOTINE POLACRILEX 4 MG
15-30 LOZENGE BUCCAL
Status: CANCELLED | OUTPATIENT
Start: 2019-05-28

## 2019-05-28 RX ADMIN — LIDOCAINE HYDROCHLORIDE 10 ML: 10 INJECTION, SOLUTION EPIDURAL; INFILTRATION; INTRACAUDAL; PERINEURAL at 15:04

## 2019-05-28 ASSESSMENT — MIFFLIN-ST. JEOR: SCORE: 1233.39

## 2019-05-28 NOTE — PROGRESS NOTES
RADIOLOGY PROCEDURE NOTE  Patient name: Louisa Peck  MRN: [id number]  : 1985    Pre-procedure diagnosis: Thyroid nodule  Post-procedure diagnosis: Same    Procedure Date/Time: May 28, 2019  3:35 PM  Procedure: Right thyroid nodule biopsy  Estimated blood loss: None  Specimen(s) collected with description: Colloid fluid aspirated, 3 FNA samples to right thyroid noudle  The patient tolerated the procedure well with no immediate complications.  Significant findings:none    See imaging dictation for procedural details.    Provider name: Giacomo Walton  Assistant(s):None

## 2019-05-28 NOTE — PROGRESS NOTES
1530 Post thyroid FNA procedure assessment negative, VSS, band aid C/D/I to throat area. Instructions were reviewed and given by Sana ANDINO. Discharged from ultrasound department ambulatory.

## 2019-05-28 NOTE — DISCHARGE INSTRUCTIONS
Thyroid Biopsy Discharge Instructions     After you go home:      You may resume your normal diet    Care of Puncture Site:      You may have mild bruising, soreness & swelling at the puncture site. This will go away in a few days    For swelling & bruising, you may use an ice pack on the site.     Activity:      You may go back to your normal activity    Avoid strenuous activity for 24 hours    Medicines:      You may resume all medications    For minor pain, you may take Acetaminophen (Tylenol) or Ibuprofen (Advil)            Call the provider who ordered this test if:      Increased redness or swelling at the site    Fluid or blood oozing from the site    Severe pain at the site    Chills or a fever greater than 101 F (38 C).    Any questions or concerns    Call  911 or go to the Emergency Room if:      Trouble breathing    Your neck swells    Bleeding that you cannot control    Severe difficulty swallowing    If you have questions call:      [facility] Radiology Dept @ [phone]    The provider who performed your procedure was _________________.

## 2019-05-30 LAB — COPATH REPORT: NORMAL

## 2019-06-13 ENCOUNTER — OFFICE VISIT (OUTPATIENT)
Dept: ENDOCRINOLOGY | Facility: CLINIC | Age: 34
End: 2019-06-13
Payer: COMMERCIAL

## 2019-06-13 VITALS
BODY MASS INDEX: 26.62 KG/M2 | HEART RATE: 81 BPM | WEIGHT: 135.6 LBS | SYSTOLIC BLOOD PRESSURE: 108 MMHG | DIASTOLIC BLOOD PRESSURE: 75 MMHG | HEIGHT: 60 IN

## 2019-06-13 DIAGNOSIS — R13.10 DYSPHAGIA, UNSPECIFIED TYPE: ICD-10-CM

## 2019-06-13 DIAGNOSIS — E04.1 THYROID NODULE: Primary | ICD-10-CM

## 2019-06-13 PROCEDURE — 99243 OFF/OP CNSLTJ NEW/EST LOW 30: CPT | Performed by: INTERNAL MEDICINE

## 2019-06-13 ASSESSMENT — MIFFLIN-ST. JEOR: SCORE: 1231.58

## 2019-06-13 NOTE — PATIENT INSTRUCTIONS
Large left lower thyroid lobe nodule  Associated neck symptoms    Plan to see Dr. Magan Lara for thyroid surgery evaluation  Contact Dr. Meredith if questions

## 2019-06-13 NOTE — PROGRESS NOTES
Name: Louisa Peck is a 33 year old woman, seen at the request of Dr. Krishna Gordon for evaluation of     Chief Complaint   Patient presents with     Thyroid Problem     HPI:  Recent issues:  Here for evaluation of thyroid nodule problem  Reviewed medical history from patient and Epic chart record        Patient noticed neck symptoms past month   Difficulty swallowing, headaches, dizziness, nausea, coughing    Midline neck 3/10 intensity neck pain    Anterior neck pulling sensation with head extension   Some dysphagia with liquids, but not solid foods    5/9/19. [facility] evaluation   Diagnosis of thyroid enlargement  5/14/19 Thyroid U/S:   Right lobe 5.1x 1.5x 1.9 cm and left lobe 5.2x 1.6x 3.0 cm   LLL 3.8x 3.4x 2.7 cm complex cystic and solid nodule  5/29/19 Thyroid nodule FNA:  Benign cytology   Mild 1.5 cm fluid removed during aspiration    Recent [facility] labs include:  Lab Results   Component Value Date    TSH 1.58 05/09/2019     Previous neck radiation exposure: None known  Thyroid medication use:  None  Fam Hx thyroid disease:  None    Other chronic illnesses include:   Anemia:   Followed by PCP   Dysmenorrhea: Followed by OBGYN practitioner      , lives in [place], works with [employer]  Previously saw Rosario Omer CNM OBGYN  Sees Dr. Krishna Gordon for general medicine evaluations.    PMH/PSH:  Past Medical History:   Diagnosis Date     Anemia      Thyroid nodule      Past Surgical History:   Procedure Laterality Date     ARTHROSCOPY KNEE WITH MEDIAL MENISCECTOMY Left      REMV/REVISN BOOT/BODY CAST Left        Family Hx:  Family History   Problem Relation Age of Onset     Diabetes Mother      Diabetes Father          Social Hx:  Social History     Socioeconomic History     Marital status:      Spouse name: Not on file     Number of children: Not on file     Years of education: Not on file     Highest education level: Not on file    Occupational History     Not on file   Social Needs     Financial resource strain: Not on file     Food insecurity:     Worry: Not on file     Inability: Not on file     Transportation needs:     Medical: Not on file     Non-medical: Not on file   Tobacco Use     Smoking status: Never Smoker     Smokeless tobacco: Never Used   Substance and Sexual Activity     Alcohol use: Yes     Alcohol/week: 0.0 oz     Comment: Rare     Drug use: No     Sexual activity: Yes     Partners: Female   Lifestyle     Physical activity:     Days per week: Not on file     Minutes per session: Not on file     Stress: Not on file   Relationships     Social connections:     Talks on phone: Not on file     Gets together: Not on file     Attends [employer] service: Not on file     Active member of club or organization: Not on file     Attends meetings of clubs or organizations: Not on file     Relationship status: Not on file     Intimate partner violence:     Fear of current or ex partner: Not on file     Emotionally abused: Not on file     Physically abused: Not on file     Forced sexual activity: Not on file   Other Topics Concern     Parent/sibling w/ CABG, MI or angioplasty before 65F 55M? Not Asked   Social History Narrative     Not on file          MEDICATIONS:  currently has no medications in their medication list.    ROS:     ROS: 10 point ROS neg other than the symptoms noted above in the HPI.    GENERAL: mild fatigue, wt stable; denies fevers, chills, night sweats.   HEENT: ant neck pressure, liquid dysphagia; denies odonophagia, diplopia  THYROID:  no apparent hyper or hypothyroid symptoms  CV: no chest pain, pressure, palpitations  LUNGS: no SOB, NICOLAS, cough, wheezing   ABDOMEN: no diarrhea, constipation, abdominal pain  EXTREMITIES: no rashes, ulcers, edema  NEUROLOGY: no headaches, denies changes in vision, tingling, extremitiy numbness   MSK: no muscle aches or pains, weakness  SKIN: no rashes or lesions  : menses regular but  menstruation pains  PSYCH:  stable mood, no significant anxiety or depression  ENDOCRINE: no heat or cold intolerance    Physical Exam   VS: /75   Pulse 81   Ht 1.524 m (5')   Wt 61.5 kg (135 lb 9.6 oz)   BMI 26.48 kg/m    GENERAL: AXOX3, NAD, well dressed, answering questions appropriately, appears stated age.  THYROID:  Left thyroid lobe enlargement, nodule area LML ~3 cm size, nontender, no neck adenopathy  HEENT: neck non-tender, no exopthalmous, no proptosis, EOMI  CV: RRR, no rubs, gallops, no murmurs  LUNGS: CTAB, no wheezes, rales, or ronchi  ABDOMEN: soft, nontender, nondistended  EXTREMITIES: no edema, +pedal pulses, no lesions  NEUROLOGY: CN grossly intact,  no tremors  MSK: grossly intact  SKIN: no rashes, no lesions    LABS:    All pertinent notes, labs, and images personally reviewed by me.     A/P:  Encounter Diagnoses   Name Primary?     Thyroid nodule Yes     Dysphagia, unspecified type      Comments:  Reviewed health history and thyroid nodule issues.  I believe her neck symptoms relate to the large left lobe nodule    Plan:  Discussed general issues with the thyroid nodule diagnosis and management  Reviewed thyroid gland anatomy and hormone physiology  Discussed lab tests used to assess patient thyroid hormone levels  We discussed the thyroid ultrasound imaging procedure  Reviewed treatment options with thyroidectomy (partial vs total), and observation plan    Recommend:  Discussed association of neck symptoms and large left thyroid lobe nodule  Would not pursue repeat thyroid FNA biopsy or aspiration  No labs ordered today  Recommend thyroid surgery consultation, surgeon options, thyroid surgery process/plan   Thyroid surgery referral placed  Needs postsurgical evaluation approx 4-6 weeks after surgery   We can create a workin appt at that time  Contact me if questions      Addressed patient questions today    Labs ordered today:   Orders Placed This Encounter   Procedures     GENERAL  SURG ADULT REFERRAL     Radiology/Consults ordered today: GENERAL SURG ADULT REFERRAL    More than 50% of the time spent with Ms. Derrick Peck on counseling / coordinating her care.  Total appointment time was 45 minutes.    Follow-up:  KERA Meredith MD  Endocrinology  [facility]  CC: FREDDY Gordon and KRISTOPHER Lara

## 2019-11-19 ENCOUNTER — MYC MEDICAL ADVICE (OUTPATIENT)
Dept: INTERNAL MEDICINE | Facility: CLINIC | Age: 34
End: 2019-11-19

## 2019-11-19 ENCOUNTER — MYC MEDICAL ADVICE (OUTPATIENT)
Dept: ENDOCRINOLOGY | Facility: CLINIC | Age: 34
End: 2019-11-19

## 2019-11-29 ENCOUNTER — HOSPITAL ENCOUNTER (OUTPATIENT)
Dept: MAMMOGRAPHY | Facility: CLINIC | Age: 34
Discharge: HOME | End: 2019-11-29
Attending: ADVANCED PRACTICE MIDWIFE | Admitting: ADVANCED PRACTICE MIDWIFE
Payer: COMMERCIAL

## 2019-11-29 DIAGNOSIS — N60.01 BREAST CYST, RIGHT: ICD-10-CM

## 2019-11-29 PROCEDURE — 76642 ULTRASOUND BREAST LIMITED: CPT | Mod: RT

## 2019-12-10 ENCOUNTER — OFFICE VISIT (OUTPATIENT)
Dept: OBGYN | Facility: CLINIC | Age: 34
End: 2019-12-10
Payer: COMMERCIAL

## 2019-12-10 VITALS — DIASTOLIC BLOOD PRESSURE: 56 MMHG | WEIGHT: 133 LBS | BODY MASS INDEX: 25.97 KG/M2 | SYSTOLIC BLOOD PRESSURE: 98 MMHG

## 2019-12-10 DIAGNOSIS — Z01.419 WELL WOMAN EXAM: Primary | ICD-10-CM

## 2019-12-10 DIAGNOSIS — Z62.819 HISTORY OF ABUSE IN CHILDHOOD: ICD-10-CM

## 2019-12-10 PROCEDURE — 99395 PREV VISIT EST AGE 18-39: CPT | Performed by: ADVANCED PRACTICE MIDWIFE
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
## 2019-12-10 NOTE — NURSING NOTE
Chief Complaint   Patient presents with     Physical       Initial BP 98/56 (BP Location: Right arm, Cuff Size: Adult Regular)   Wt 60.3 kg (133 lb)   LMP 2019 (Approximate)   Breastfeeding No   BMI 25.97 kg/m   Estimated body mass index is 25.97 kg/m  as calculated from the following:    Height as of 19: 1.524 m (5').    Weight as of this encounter: 60.3 kg (133 lb).  BP completed using cuff size: regular    Questioned patient about current smoking habits.  Pt. has never smoked.          The following HM Due: NONE      Derian Chua CMA

## 2020-02-10 ENCOUNTER — OFFICE VISIT (OUTPATIENT)
Dept: INTERNAL MEDICINE | Facility: CLINIC | Age: 35
End: 2020-02-10
Payer: COMMERCIAL

## 2020-02-10 VITALS
HEART RATE: 69 BPM | OXYGEN SATURATION: 99 % | WEIGHT: 136 LBS | BODY MASS INDEX: 26.7 KG/M2 | TEMPERATURE: 98.4 F | HEIGHT: 60 IN | RESPIRATION RATE: 16 BRPM | DIASTOLIC BLOOD PRESSURE: 52 MMHG | SYSTOLIC BLOOD PRESSURE: 90 MMHG

## 2020-02-10 DIAGNOSIS — N64.4 BREAST PAIN: Primary | ICD-10-CM

## 2020-02-10 PROCEDURE — 99213 OFFICE O/P EST LOW 20 MIN: CPT | Performed by: PHYSICIAN ASSISTANT

## 2020-02-10 ASSESSMENT — MIFFLIN-ST. JEOR: SCORE: 1228.39

## 2020-02-10 NOTE — PROGRESS NOTES
Subjective     Louisa Peck is a 34 year old female who presents to clinic today for the following health issues:    HPI   Concern - Breast Pain  Onset: 9 days    Description:   Bilateral breast pain more on the medial area    Intensity: mild    Progression of Symptoms:  same    Accompanying Signs & Symptoms:  Denies any redness, swelling, tenderness. Notices some lumps    Previous history of similar problem:   yes    Precipitating factors:   Worsened by: lifting arms up- feels pulling    Alleviating factors:  Improved by: None    Therapies Tried and outcome: None  Patient is mid cycle     Has also been doing exercises for painful shoulder   -------------------------------------    BP Readings from Last 3 Encounters:   02/10/20 90/52   12/10/19 98/56   06/13/19 108/75    Wt Readings from Last 3 Encounters:   02/10/20 61.7 kg (136 lb)   12/10/19 60.3 kg (133 lb)   06/13/19 61.5 kg (135 lb 9.6 oz)             Patient with bilateral diagnostic mammogram and ultrasound done this past year        -------------------------------------  Reviewed and updated as needed this visit by Provider  Allergies  Meds         Review of Systems   ROS COMP: Constitutional, HEENT, cardiovascular, pulmonary, gi and gu systems are negative, except as otherwise noted.      Objective    BP 90/52 (BP Location: Left arm, Patient Position: Chair, Cuff Size: Adult Regular)   Pulse 69   Temp 98.4  F (36.9  C) (Oral)   Resp 16   Ht 1.524 m (5')   Wt 61.7 kg (136 lb)   SpO2 99%   BMI 26.56 kg/m    Body mass index is 26.56 kg/m .  Physical Exam   GENERAL: healthy, alert and no distress  RESP: lungs clear to auscultation - no rales, rhonchi or wheezes  BREAST: normal without masses, tenderness or nipple discharge, no palpable axillary masses or adenopathy and tenderness bilateral medial sides of both  CV: regular rate and rhythm, normal S1 S2, no S3 or S4, no murmur, click or rub, no peripheral edema and peripheral pulses  strong  MS: no gross musculoskeletal defects noted, no edema    Diagnostic Test Results:  none         Assessment & Plan     1. Breast pain  Likely benign  Bilateral pain and mid cycle  Should improve this month  If continues or new lump or bump noted then recheck        BMI:   Estimated body mass index is 26.56 kg/m  as calculated from the following:    Height as of this encounter: 1.524 m (5').    Weight as of this encounter: 61.7 kg (136 lb).               Return in about 3 months (around 5/10/2020) for Physical Exam, regular primary provider.    Bonny Aguilar PA-C  [facility]

## 2020-03-10 ENCOUNTER — HEALTH MAINTENANCE LETTER (OUTPATIENT)
Age: 35
End: 2020-03-10

## 2020-03-26 ENCOUNTER — HOSPITAL ENCOUNTER (EMERGENCY)
Facility: CLINIC | Age: 35
Discharge: HOME | End: 2020-03-26
Attending: PHYSICIAN ASSISTANT | Admitting: PHYSICIAN ASSISTANT
Payer: COMMERCIAL

## 2020-03-26 VITALS — TEMPERATURE: 98.2 F | HEART RATE: 82 BPM | OXYGEN SATURATION: 98 %

## 2020-03-26 DIAGNOSIS — R09.89 CHEST CONGESTION: ICD-10-CM

## 2020-03-26 DIAGNOSIS — J06.9 VIRAL URI WITH COUGH: ICD-10-CM

## 2020-03-26 PROCEDURE — 99282 EMERGENCY DEPT VISIT SF MDM: CPT

## 2020-03-26 ASSESSMENT — ENCOUNTER SYMPTOMS
CHILLS: 0
COUGH: 1
FEVER: 0
ABDOMINAL PAIN: 0
SHORTNESS OF BREATH: 0
VOMITING: 0

## 2020-03-26 NOTE — ED PROVIDER NOTES
History     Chief Complaint:  Cough    HPI   Louisa Peck is a 34 year old female with a history of anemia who presents to the emergency department for evaluation of a cough. The patient reports cough, sore throat, and fatigue over the last 2 days. Notes associated chest congestion and chest pain with coughing. Denies shortness of breath or difficulty breathing. 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     Allergies:  No Known Drug Allergies    Medications:    The patient is not currently taking any prescribed medications.    Past Medical History:    Anemia  Thyroid nodule    Past Surgical History:    Knee arthroscopy with medial meniscectomy  Removal/revision boot/bodycast     Family History:    Mother: diabetes  Father: diabetes    Social History:  Smoking Status: Never  Smokeless Tobacco: Never  Alcohol Use: Yes  Drug Use: No  Marital Status:        Review of Systems   Constitutional: Negative for chills and fever.   Respiratory: Positive for cough. Negative for shortness of breath.    Cardiovascular: Positive for chest pain (with coughing).   Gastrointestinal: Negative for abdominal pain and vomiting.   All other systems reviewed and are negative.      Physical Exam   Vitals:  Patient Vitals for the past 24 hrs:   Temp Temp src Pulse Heart Rate SpO2   03/26/20 1851 98.2  F (36.8  C) Oral 82 82 98 %     Physical Exam  Vital Signs :Pulse 82   Temp 98.2  F (36.8  C) (Oral)   SpO2 98%        HENT: Voice normal    Respiratory:  No respiratory distress, able to speak in full sentences    Neurologic: Alert and interacting normally      Emergency Department Course     Emergency Department Course:  Past medical records, nursing notes, and vitals reviewed.    I performed an exam of the patient as documented above.     . Patient discharged home  with instructions regardingPatient supportive care, medications, and reasons to return as well as the importance of close follow-up was  reviewed.    Impression & Plan      Medical Decision Making:  Louisa Peck is a 34 year old female comes today with symptoms that are concerning for possible COVID19.  Vital signs are reassuring.  The patient is not hypoxic.  The patient's work of breathing is normal.  Mentation is normal.  The patient does not require hospitalization.  Patient reports chest pain with coughing and I doubt more serious cardiopulmonary source such as ACS or PE.     At this time, I believe the patient does not meet criteria for hospitalization. The patient was given the current [place] Department of Health guidelines on self-isolation.  They are asked to follow-up via telemetry medicine.  They are asked to drink plenty of fluids.  They are asked to return if they develop severe difficulty in breathing or worsening.     Diagnosis:    ICD-10-CM    1. Viral URI with cough  J06.9     B97.89    2. Chest congestion  R09.89        Disposition:  discharged to home    Discharge Medications:  New Prescriptions    No medications on file     ILogan, am serving as a scribe on 3/26/2020 at 6:54 PM to personally document services performed by Sarah Foley PA-C based on my observations and the provider's statements to me.   Logan Almazan  3/26/2020    EMERGENCY DEPARTMENT       Sarah Foley PA-C  03/26/20 [id number]

## 2020-03-27 NOTE — DISCHARGE INSTRUCTIONS
Discharge Instructions  COVID-19    Your Provider has determined that you should practice self-isolation and self-monitoring in order to protect yourself and your community from COVID-19, which is the disease caused by a new coronavirus. The virus spreads from person to person primarily by droplets when an infected person coughs or sneezes and the droplet either lands on another person or that other person touches a surface with the droplet on it. Diagnosis of COVID-19 can be made with a test but many times the test is unavailable or not necessary. There is no specific treatment or medicine for the disease.    Symptoms of COVID-19  Many people have no symptoms or mild symptoms.  Symptoms may usually appear 4 to 5 days (up to 14 days) after contact with another ill person. Some people will get severe symptoms and pneumonia. Usual symptoms are:     Fever    Cough    Trouble breathing    Less common symptoms are: Headache, body aches, sore throat, sneezing,               diarrhea.    How to Care for Yourself    Stay home  Most people will recover from illness with mild symptoms.  Isolation by staying home is the best method to prevent the spread of the illness. Do not go to work or school. Have a friend or relative do your shopping. Do not use public transportation (bus, train) or ridesharing (Lyft, Uber).    How long should I stay home?    If you have symptoms of a respiratory disease (fever, cough), you should stay home for at least 7 days, and for 3 days with no fever and improvement of respiratory symptoms--whichever is longer. (Your fever should be gone for 3 days without using fever-reducing medicine.)  For example, if you have a fever and coughing for 4 days, you need to stay home 3 more days with no fever for a total of 7 days. Or, if you have a fever and coughing for 5 days, you need to stay home 3 more days with no fever for a total of 8 days.    Separate yourself from other people in your home.? As much as  possible, you should stay in one room and away from other people in your home. Also, use a separate bathroom, if possible. Avoid handling pets or other animals while sick.     Wear a facemask: if you need to be around other people and cover your mouth and nose with a tissue when you cough or sneeze.     Avoid sharing personal household items. You should not share dishes, drinking glasses, forks/knives/spoons, towels, or bedding with other people in your home. After using these items, they should be washed with soap and water. Clean parts of your home that are touched often (doorknobs, faucets, countertops, etc.) daily.     Wash your hands often with soap and water for at least 20 seconds or use an alcohol-based hand  containing at least 60% alcohol.     Avoid touching your face    Treat your symptoms: Take over the counter medications like ibuprofen (Advil or Motrin) or Acetaminophen (Tylenol). Drink fluids. Rest.    Watch for worsening symptoms: shortness of breath, or difficulty breathing.    Return to the Emergency Department if:    If you are developing worsening breathing, shortness of breath, or feel worse you should seek medical attention.  If you are uncertain, contact your health care provider/clinic. If you need emergency medical attention, call 911 and tell them you have been ill.

## 2020-10-01 ENCOUNTER — OFFICE VISIT (OUTPATIENT)
Dept: INTERNAL MEDICINE | Facility: CLINIC | Age: 35
End: 2020-10-01
Payer: COMMERCIAL

## 2020-10-01 VITALS
RESPIRATION RATE: 16 BRPM | TEMPERATURE: 97.5 F | DIASTOLIC BLOOD PRESSURE: 68 MMHG | HEART RATE: 74 BPM | BODY MASS INDEX: 25.97 KG/M2 | WEIGHT: 133 LBS | OXYGEN SATURATION: 100 % | SYSTOLIC BLOOD PRESSURE: 112 MMHG

## 2020-10-01 DIAGNOSIS — K59.00 CONSTIPATION, UNSPECIFIED CONSTIPATION TYPE: Primary | ICD-10-CM

## 2020-10-01 DIAGNOSIS — K21.9 GASTROESOPHAGEAL REFLUX DISEASE, UNSPECIFIED WHETHER ESOPHAGITIS PRESENT: ICD-10-CM

## 2020-10-01 PROCEDURE — 84443 ASSAY THYROID STIM HORMONE: CPT | Performed by: INTERNAL MEDICINE

## 2020-10-01 PROCEDURE — 36415 COLL VENOUS BLD VENIPUNCTURE: CPT | Performed by: INTERNAL MEDICINE

## 2020-10-01 PROCEDURE — 99214 OFFICE O/P EST MOD 30 MIN: CPT | Performed by: INTERNAL MEDICINE

## 2020-10-01 NOTE — PATIENT INSTRUCTIONS
Omeprazole/ Prilosec 20mg capsule, 1 capsule daily in AM about 30 min before breakfast to reduce acid in stomach and esophagus/heartburn for 14 days. Then try stopping the medication   Miralax 17g (capful)  with large glass of water or other liquid daily as needed for constipation. May need every 2-3 days if stools getting too loose. Get over the counter   If recurrent blood in stools after 1 week, then contact clinic via [employer] and will refer to gastroenterology to scope the colon  Thyroid lab today

## 2020-10-01 NOTE — PROGRESS NOTES
Subjective     Louisa Peck is a 35 year old female who presents to clinic today for the following health issues:    HPI       Chief Complaint   Patient presents with     Abdominal Pain     Patient c/o off and on for 6 months, but more noticable 3 months ago     Rectal Problem     Patient c/o BRB in stool about 6 months ago.            Pt's past medical history, family history, habits, medications and allergies were reviewed with the patient today.  See snap shot for  HCM status. Most recent lab results reviewed with pt. Problem list and histories reviewed & adjusted, as indicated.  Additional history as below:     Stools sometimes hard and sometimes softer  Some pain with BMs. Last blood a week ago along surface or brown stool.    Last BM 3 days ago  Mild nausea. No emesis   Occ GERD  Mild pain today  Hac chicken today and liked it   No recent travel  Weight down 3 pounds in 8 mos     Additional ROS:   Constitutional, HEENT, Cardiovascular, Pulmonary, GI and , Neuro, MSK and Psych review of systems/symptoms are otherwise negative or unchanged from previous, except as noted above.      OBJECTIVE:  /68   Pulse 74   Temp 97.5  F (36.4  C) (Tympanic)   Resp 16   Wt 60.3 kg (133 lb)   SpO2 100%   BMI 25.97 kg/m     Estimated body mass index is 25.97 kg/m  as calculated from the following:    Height as of 2/10/20: 1.524 m (5').    Weight as of this encounter: 60.3 kg (133 lb).     Neck: no adenopathy. Thyroid normal to palpation. No bruits  Pulm: Lungs clear to auscultation   CV: Regular rates and rhythm  GI: Soft, minimally tender to palpation periumbilical area without R/G.  Normal active bowel sounds, No hepatosplenomegaly or masses palpable  Ext: Peripheral pulses intact. No edema.  Neuro: Normal strength and tone, sensory exam grossly normal  Rectal: Guaiac neg stool. NO hemorrhoid palpable    Assessment/Plan: (See plan discussion below for further details)  1. Constipation, unspecified  constipation type   Lab to r/o thyroid cause. Miralax and hydration for treatment  - TSH with free T4 reflex  - polyethylene glycol (MIRALAX) 17 GM/Dose powder; Take 17 g (1 capful) by mouth daily  Dispense:      2. Gastroesophageal reflux disease, unspecified whether esophagitis present  ? related to #1. 2 week course of Omeprazole. Minimize caffeine.  Rare ETOH  - omeprazole (PRILOSEC) 20 MG DR capsule; Take 1 capsule (20 mg) by mouth daily for 14 days  Dispense: 14 capsule; Refill: 0    Plan discussion:   Omeprazole/ Prilosec 20mg capsule, 1 capsule daily in AM about 30 min before breakfast to reduce acid in stomach and esophagus/heartburn. Take for 14 days and then try stopping the medication   Miralax 17g with large glass of water or other liquid daily as needed for constipation. May need every 2-3 days if stools getting too loose. Get over the counter   If recurrent blood in stools after 1 week, then contact clinic via Mychart and will refer to gastroenterology to scope the colon  Thyroid lab today       Krishna Gordon MD  Internal Medicine Department  [facility]    (Chart documentation was completed, in part, with [employer] voice-recognition software. Even though reviewed, some grammatical, spelling, and word errors may remain.)

## 2020-10-02 LAB — TSH SERPL DL<=0.005 MIU/L-ACNC: 2.81 MU/L (ref 0.4–4)

## 2020-10-11 RX ORDER — POLYETHYLENE GLYCOL 3350 17 G/17G
1 POWDER, FOR SOLUTION ORAL DAILY
Start: 2020-10-11 | End: 2021-07-22

## 2020-10-12 PROBLEM — K21.9 GASTROESOPHAGEAL REFLUX DISEASE, UNSPECIFIED WHETHER ESOPHAGITIS PRESENT: Status: ACTIVE | Noted: 2020-10-12

## 2020-10-12 PROBLEM — K59.00 CONSTIPATION, UNSPECIFIED CONSTIPATION TYPE: Status: ACTIVE | Noted: 2020-10-12

## 2020-11-04 ENCOUNTER — ANCILLARY PROCEDURE (OUTPATIENT)
Dept: GENERAL RADIOLOGY | Facility: CLINIC | Age: 35
End: 2020-11-04
Attending: INTERNAL MEDICINE
Payer: COMMERCIAL

## 2020-11-04 ENCOUNTER — OFFICE VISIT (OUTPATIENT)
Dept: INTERNAL MEDICINE | Facility: CLINIC | Age: 35
End: 2020-11-04
Payer: COMMERCIAL

## 2020-11-04 VITALS
BODY MASS INDEX: 27.15 KG/M2 | WEIGHT: 139 LBS | RESPIRATION RATE: 14 BRPM | TEMPERATURE: 98.6 F | SYSTOLIC BLOOD PRESSURE: 112 MMHG | DIASTOLIC BLOOD PRESSURE: 70 MMHG | HEART RATE: 91 BPM | OXYGEN SATURATION: 100 %

## 2020-11-04 DIAGNOSIS — R10.32 ABDOMINAL PAIN, LEFT LOWER QUADRANT: Primary | ICD-10-CM

## 2020-11-04 DIAGNOSIS — R10.32 ABDOMINAL PAIN, LEFT LOWER QUADRANT: ICD-10-CM

## 2020-11-04 LAB
ALBUMIN UR-MCNC: NEGATIVE MG/DL
APPEARANCE UR: CLEAR
BILIRUB UR QL STRIP: NEGATIVE
COLOR UR AUTO: YELLOW
ERYTHROCYTE [DISTWIDTH] IN BLOOD BY AUTOMATED COUNT: 14.3 % (ref 10–15)
ERYTHROCYTE [SEDIMENTATION RATE] IN BLOOD BY WESTERGREN METHOD: 14 MM/H (ref 0–20)
GLUCOSE UR STRIP-MCNC: NEGATIVE MG/DL
HCT VFR BLD AUTO: 33.9 % (ref 35–47)
HGB BLD-MCNC: 10.8 G/DL (ref 11.7–15.7)
HGB UR QL STRIP: NEGATIVE
KETONES UR STRIP-MCNC: NEGATIVE MG/DL
LEUKOCYTE ESTERASE UR QL STRIP: NEGATIVE
MCH RBC QN AUTO: 26.7 PG (ref 26.5–33)
MCHC RBC AUTO-ENTMCNC: 31.9 G/DL (ref 31.5–36.5)
MCV RBC AUTO: 84 FL (ref 78–100)
NITRATE UR QL: NEGATIVE
PH UR STRIP: 6.5 PH (ref 5–7)
PLATELET # BLD AUTO: 179 10E9/L (ref 150–450)
RBC # BLD AUTO: 4.05 10E12/L (ref 3.8–5.2)
SOURCE: NORMAL
SP GR UR STRIP: <=1.005 (ref 1–1.03)
UROBILINOGEN UR STRIP-ACNC: 0.2 EU/DL (ref 0.2–1)
WBC # BLD AUTO: 8 10E9/L (ref 4–11)

## 2020-11-04 PROCEDURE — 36415 COLL VENOUS BLD VENIPUNCTURE: CPT | Performed by: INTERNAL MEDICINE

## 2020-11-04 PROCEDURE — 85652 RBC SED RATE AUTOMATED: CPT | Performed by: INTERNAL MEDICINE

## 2020-11-04 PROCEDURE — 74019 RADEX ABDOMEN 2 VIEWS: CPT | Performed by: RADIOLOGY

## 2020-11-04 PROCEDURE — 81003 URINALYSIS AUTO W/O SCOPE: CPT | Performed by: INTERNAL MEDICINE

## 2020-11-04 PROCEDURE — 80048 BASIC METABOLIC PNL TOTAL CA: CPT | Performed by: INTERNAL MEDICINE

## 2020-11-04 PROCEDURE — 85027 COMPLETE CBC AUTOMATED: CPT | Performed by: INTERNAL MEDICINE

## 2020-11-04 PROCEDURE — 99214 OFFICE O/P EST MOD 30 MIN: CPT | Performed by: INTERNAL MEDICINE

## 2020-11-04 NOTE — PROGRESS NOTES
Subjective     Louisa Peck is a 35 year old female who presents to clinic today for the following health issues:    HPI         Flank pain   Onset/Duration: 1 week   Description:   Location of pain: left low flank    Character of pain: sharp  Pain radiation: left hip and pelvis   New numbness or weakness in legs, not attributed to pain: YES- left upper leg   Intensity: Currently 6/10, At its worst 7/10  Progression of Symptoms: worsening  History: Patient states she has slight pain with urination   Specific cause: none  Pain interferes with job:  no   History of back problems: no prior back problems  Any previous MRI or X-rays: None  Sees a specialist for back pain: No  Alleviating factors:   Improved by: drinking water     Precipitating factors:  Worsened by: sitting and laying   Therapies tried and outcome: A[place]ichael helps but hurt her stomach, reducing     Accompanying Signs & Symptoms:  Risk of Fracture: None  Risk of Cauda Equina: None  Risk of Infection: None  Risk of Cancer: None  Risk of Ankylosing Spondylitis: Onset at age <35, male, AND morning back stiffness  no     Not pregnant, lesbian as not sexually active with men,  1 week from cycle, current sexual partner without any complaints.      Review of Systems   CONSTITUTIONAL: NEGATIVE for fever, change in weight, occ. chills  EYES: NEGATIVE for vision changes or irritation  ENT/MOUTH: NEGATIVE for ear, mouth and throat problems  RESP: NEGATIVE for significant cough or SOB  CV: NEGATIVE for chest pain, palpitations or peripheral edema  GI: NEGATIVE for nausea, abdominal pain, heartburn, or change in bowel habits  NEURO: NEGATIVE for weakness, dizziness or paresthesias  HEME: NEGATIVE for bleeding problems  PSYCHIATRIC: NEGATIVE for changes in mood or affect      Objective    /70   Pulse 91   Temp 98.6  F (37  C) (Temporal)   Resp 14   Wt 63 kg (139 lb)   LMP 10/10/2020 (Approximate)   SpO2 100%   Breastfeeding No   BMI 27.15 kg/m     Body mass index is 27.15 kg/m .  Physical Exam   GENERAL: alert and no distress  EYES: Eyes grossly normal to inspection, PERRL and conjunctivae and sclerae normal  HENT: ear canals and TM's normal, nose and mouth without ulcers or lesions  NECK: no adenopathy, no asymmetry, masses, or scars and thyroid normal to palpation  RESP: lungs clear to auscultation - no rales, rhonchi or wheezes  CV: regular rate and rhythm, normal S1 S2, no S3 or S4, no murmur, click or rub, no peripheral edema and peripheral pulses strong  ABDOMEN: There is mild obesity per height.  There is no rebound or guarding.  There is mild tenderness noted to the mid left on deep palpation.  There is mild point tenderness noted to the mid left back with mild flank pain upon percussion.  MS: Full range of motion all extremities.  NEURO: Normal strength and tone, mentation intact and speech normal  PSYCH: mentation appears normal, affect normal/bright        Assessment & Plan     Abdominal pain, left lower quadrant  Suggest patient needs evaluation for urinary tract infection, rule out early pyelonephritis with assessment for kidney stone also.  Question musculoskeletal disorders although slightly atypical presentation.  Discussed with patient, awaiting results  - Basic metabolic panel  (Ca, Cl, CO2, Creat, Gluc, K, Na, BUN)  - CBC with platelets  - ESR: Erythrocyte sedimentation rate  - XR Abdomen 2 Views; Future  - *UA reflex to Microscopic and Culture ([place] [facility] (except [place] and [place])    11/5/20 - Called and discussed results of blood work urinalysis and imaging study.  KUB reveals retained stool but no obvious obstruction or calculi.  Urinalysis is free of any obvious infection or blood.  Sed rate is normal.  Hemoglobin is mildly level with noted results fairly similar 1 year ago.  Discussed with patient potential need for imaging of abdomen and pelvis if symptoms do not improve.  Options were discussed.  She would  like to see how she does over the weekend and if her symptoms do not get any better will follow-up to discuss further evaluation and potential assessment of her hemoglobin to rule out an iron deficient state, question secondary to menstrual cycles.     BMI:   Estimated body mass index is 27.15 kg/m  as calculated from the following:    Height as of 2/10/20: 1.524 m (5').    Weight as of this encounter: 63 kg (139 lb).   Weight management plan: Discussed healthy diet and exercise guidelines         See Patient Instructions    No follow-ups on file.    Carlos Marcos MD  [facility]

## 2020-11-05 LAB
ANION GAP SERPL CALCULATED.3IONS-SCNC: 7 MMOL/L (ref 3–14)
BUN SERPL-MCNC: 21 MG/DL (ref 7–30)
CALCIUM SERPL-MCNC: 8.5 MG/DL (ref 8.5–10.1)
CHLORIDE SERPL-SCNC: 106 MMOL/L (ref 94–109)
CO2 SERPL-SCNC: 23 MMOL/L (ref 20–32)
CREAT SERPL-MCNC: 1.02 MG/DL (ref 0.52–1.04)
GFR SERPL CREATININE-BSD FRML MDRD: 70 ML/MIN/{1.73_M2}
GLUCOSE SERPL-MCNC: 83 MG/DL (ref 70–99)
POTASSIUM SERPL-SCNC: 3.8 MMOL/L (ref 3.4–5.3)
SODIUM SERPL-SCNC: 136 MMOL/L (ref 133–144)

## 2020-12-27 ENCOUNTER — HEALTH MAINTENANCE LETTER (OUTPATIENT)
Age: 35
End: 2020-12-27

## 2021-01-15 ENCOUNTER — HEALTH MAINTENANCE LETTER (OUTPATIENT)
Age: 36
End: 2021-01-15

## 2021-01-26 ENCOUNTER — NURSE TRIAGE (OUTPATIENT)
Dept: INTERNAL MEDICINE | Facility: CLINIC | Age: 36
End: 2021-01-26

## 2021-01-26 NOTE — TELEPHONE ENCOUNTER
"    Reason for Disposition    Pain also present in shoulder(s) or arm(s) or jaw    Additional Information    Negative: Followed an injury to chest    Negative: Severe difficulty breathing (e.g., struggling for each breath, speaks in single words)    Negative: Passed out (i.e., fainted, collapsed and was not responding)    Negative: Visible sweat on face or sweat dripping down face    Negative: Sounds like a life-threatening emergency to the triager    Negative: Chest pain lasting longer than 5 minutes and ANY of the following:* Over 50 years old* Over 30 years old and at least one cardiac risk factor (i.e., high blood pressure, diabetes, high cholesterol, obesity, smoker or strong family history of heart disease)* Pain is crushing, pressure-like, or heavy * Took nitroglycerin and chest pain was not relieved* History of heart disease (i.e., angina, heart attack, bypass surgery, angioplasty, CHF)    Negative: SEVERE chest pain    Negative: Difficulty breathing    Negative: Cocaine use within last 3 days    Negative: History of prior 'blood clot' in leg or lungs (i.e., deep vein thrombosis, pulmonary embolism)    Negative: Recent illness requiring prolonged bed rest (i.e., immobilization)    Negative: Major surgery in the past month    Negative: Hip or leg fracture in past 2 months (e.g, or had cast on leg or ankle)    Negative: Recent long-distance travel with prolonged time in car, bus, plane, or train (i.e., within past 2 weeks; 6 or more hours duration)    Negative: Heart beating irregularly or very rapidly    Intermittent chest pain and pain has been increasing in severity or frequency    Negative: Chest pain lasting longer than 5 minutes    Negative: Coughing up blood    Negative: Dizziness or lightheadedness    Negative: Patient sounds very sick or weak to the triager    Answer Assessment - Initial Assessment Questions  1. LOCATION: \"Where does it hurt?\"        Middle of chest  2. RADIATION: \"Does the pain go " "anywhere else?\" (e.g., into neck, jaw, arms, back)      Maybe to the right side. Sometimes to the right arm. Nothing in the neck or jaw.  3. ONSET: \"When did the chest pain begin?\" (Minutes, hours or days)       2 weeks ago  4. PATTERN \"Does the pain come and go, or has it been constant since it started?\"  \"Does it get worse with exertion?\"       Comes and goes. Sharp. Notices it when moving around.   5. DURATION: \"How long does it last\" (e.g., seconds, minutes, hours)      Goes on for a few minutes but goes to a more lesser/ dull pain  6. SEVERITY: \"How bad is the pain?\"  (e.g., Scale 1-10; mild, moderate, or severe)     - MILD (1-3): doesn't interfere with normal activities      - MODERATE (4-7): interferes with normal activities or awakens from sleep     - SEVERE (8-10): excruciating pain, unable to do any normal activities        Mild, not intense 3-4 out of 10 pain, today  7. CARDIAC RISK FACTORS: \"Do you have any history of heart problems or risk factors for heart disease?\" (e.g., prior heart attack, angina; high blood pressure, diabetes, being overweight, high cholesterol, smoking, or strong family history of heart disease)      None. Family also does not have any heart issues  8. PULMONARY RISK FACTORS: \"Do you have any history of lung disease?\"  (e.g., blood clots in lung, asthma, emphysema, birth control pills)      Not a current smoker. No OCP. No asthma  9. CAUSE: \"What do you think is causing the chest pain?\"      Unsure. Just woke with the pain. Does work in construction.  Pain is not as bad as it was 2 weeks ago. Had 2 negative covid tests, negative twice: 1st test was 1/10 and 2nd test was last week. Tried aleve for the pain, helps some    10. OTHER SYMPTOMS: \"Do you have any other symptoms?\" (e.g., dizziness, nausea, vomiting, sweating, fever, difficulty breathing, cough)        Headache. Sometimes nausea. Sometimes hold breath when pain is sharp. No SOB today. No cough. No fever. No " "diaphoresis  11. PREGNANCY: \"Is there any chance you are pregnant?\" \"When was your last menstrual period?\"        No. LMP- last week    Protocols used: CHEST PAIN-A-OH      "

## 2021-01-27 ENCOUNTER — ANCILLARY PROCEDURE (OUTPATIENT)
Dept: GENERAL RADIOLOGY | Facility: CLINIC | Age: 36
End: 2021-01-27
Attending: INTERNAL MEDICINE
Payer: COMMERCIAL

## 2021-01-27 ENCOUNTER — OFFICE VISIT (OUTPATIENT)
Dept: INTERNAL MEDICINE | Facility: CLINIC | Age: 36
End: 2021-01-27
Payer: COMMERCIAL

## 2021-01-27 VITALS
BODY MASS INDEX: 27.33 KG/M2 | HEART RATE: 76 BPM | SYSTOLIC BLOOD PRESSURE: 106 MMHG | HEIGHT: 60 IN | TEMPERATURE: 98.5 F | WEIGHT: 139.2 LBS | OXYGEN SATURATION: 100 % | RESPIRATION RATE: 15 BRPM | DIASTOLIC BLOOD PRESSURE: 66 MMHG

## 2021-01-27 DIAGNOSIS — R07.89 CHEST WALL PAIN: Primary | ICD-10-CM

## 2021-01-27 DIAGNOSIS — R07.89 CHEST WALL PAIN: ICD-10-CM

## 2021-01-27 LAB
D DIMER PPP FEU-MCNC: <0.3 UG/ML FEU (ref 0–0.5)
ERYTHROCYTE [SEDIMENTATION RATE] IN BLOOD BY WESTERGREN METHOD: 12 MM/H (ref 0–20)
HGB BLD-MCNC: 11.8 G/DL (ref 11.7–15.7)
TROPONIN I SERPL-MCNC: <0.015 UG/L (ref 0–0.04)

## 2021-01-27 PROCEDURE — 36415 COLL VENOUS BLD VENIPUNCTURE: CPT | Performed by: INTERNAL MEDICINE

## 2021-01-27 PROCEDURE — 85652 RBC SED RATE AUTOMATED: CPT | Performed by: INTERNAL MEDICINE

## 2021-01-27 PROCEDURE — 84484 ASSAY OF TROPONIN QUANT: CPT | Performed by: INTERNAL MEDICINE

## 2021-01-27 PROCEDURE — 99214 OFFICE O/P EST MOD 30 MIN: CPT | Performed by: INTERNAL MEDICINE

## 2021-01-27 PROCEDURE — 85018 HEMOGLOBIN: CPT | Performed by: INTERNAL MEDICINE

## 2021-01-27 PROCEDURE — 71046 X-RAY EXAM CHEST 2 VIEWS: CPT | Performed by: RADIOLOGY

## 2021-01-27 PROCEDURE — 93000 ELECTROCARDIOGRAM COMPLETE: CPT | Performed by: INTERNAL MEDICINE

## 2021-01-27 PROCEDURE — 85379 FIBRIN DEGRADATION QUANT: CPT | Performed by: INTERNAL MEDICINE

## 2021-01-27 ASSESSMENT — MIFFLIN-ST. JEOR: SCORE: 1237.91

## 2021-01-27 NOTE — PROGRESS NOTES
Assessment & Plan     Chest wall pain  Symptoms appear to be musculoskeletal in origin without any obvious focal source.  Suggest a lab assessment chest x-ray for reassurance.  Have advised patient to be diligent on daily dosing of anti-inflammatories through the weekend and see how she responds.  - XR Chest 2 Views; Future  - D dimer, quantitative  - EKG 12-lead complete w/read - Clinics  - Troponin I  - Hemoglobin  - ESR: Erythrocyte sedimentation rate    30 minutes spent with patient       See Patient Instructions    Return in about 2 weeks (around 2/10/2021) for if symptoms recur or worsen.    Carlos Marcos MD  [facility] BENNY Jasso is a 35 year old who presents to clinic today for the following health issues     HPI     See triage note reviewed.      Patient does not recall any obvious precipitating event.  She states that 2 weeks ago she woke up with some nonspecific upper right chest wall pain.  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  She denies any major trauma.  She does not use birth control.  She reports not being sexually active with men.  She denies pregnancy.  She states she has been taking some anti-inflammatories and does note that it helps.  She states her symptoms today are slightly better than they were initially.    Chest Pain, atypical :    Onset/Duration: 2 weeks   Description:   Location: top of sternum  Character: sharp  Radiation: from right side over shoulder  Duration: constant, varies in intensity    Intensity: moderate  Progression of Symptoms: improving  Accompanying Signs & Symptoms:  Shortness of breath: occassionally   Sweating: no  Nausea/vomiting: YES- nausea and headache   Lightheadedness: YES- 2 episodes   Palpitations: no  Fever:  no  Cough: no           Heartburn: YES  History:   Family history of heart disease: no  Tobacco use: no  Previous similar symptoms: no   Precipitating factors:   Worse with exertion:  YES  Worse with deep breaths: YES           Related to eating: no           Better with burping: no  Alleviating factors: moderate  Therapies tried and outcome: carlos Jefferson     There is no documentation of birth control pill use.    Review of Systems   CONSTITUTIONAL: NEGATIVE for  change in weight  EYES: NEGATIVE for vision changes or irritation  ENT/MOUTH: NEGATIVE for ear, mouth and throat problems  RESP: NEGATIVE for significant cough  GI: NEGATIVE for nausea, abdominal pain or change in bowel habits  : NEGATIVE for frequency, dysuria, or hematuria  NEURO: NEGATIVE for weakness, dizziness or paresthesias  HEME: NEGATIVE for bleeding problems  PSYCHIATRIC: NEGATIVE for changes in mood or affect      Objective    /66   Pulse 76   Temp 98.5  F (36.9  C) (Temporal)   Resp 15   Ht 1.524 m (5')   Wt 63.1 kg (139 lb 3.2 oz)   LMP 01/17/2021 (Approximate)   SpO2 100%   Breastfeeding No   BMI 27.19 kg/m    Body mass index is 27.19 kg/m .  Physical Exam   GENERAL: healthy, alert and no distress  EYES: Eyes grossly normal to inspection, PERRL and conjunctivae and sclerae normal  HENT: ear canals and TM's normal, nose and mouth without ulcers or lesions  NECK: no adenopathy, no asymmetry, masses, or scars and thyroid normal to palpation  RESP: lungs clear to auscultation - no rales, rhonchi or wheezes  There is reproducible chest wall pain noted to the right upper chest wall.  Upon palpation this reproduces discomfort that is not present in the left upper chest.  CV: regular rate and rhythm, normal S1 S2, no S3 or S4, no click or rub, no peripheral edema and peripheral pulses strong  MS: no gross musculoskeletal defects noted  NEURO: No focal changes  PSYCH: mentation appears normal, affect normal/bright        EKG done: There is a normal sinus rhythm with a heart rate of roughly 76 bpm.  No obvious no acute changes noted.  There is no baseline comparison available.

## 2021-07-09 ENCOUNTER — OFFICE VISIT (OUTPATIENT)
Dept: OBGYN | Facility: CLINIC | Age: 36
End: 2021-07-09
Payer: COMMERCIAL

## 2021-07-09 VITALS
BODY MASS INDEX: 27.88 KG/M2 | SYSTOLIC BLOOD PRESSURE: 118 MMHG | DIASTOLIC BLOOD PRESSURE: 68 MMHG | HEIGHT: 60 IN | WEIGHT: 142 LBS

## 2021-07-09 DIAGNOSIS — Z78.9 RUBELLA IMMUNE STATUS NOT KNOWN: ICD-10-CM

## 2021-07-09 DIAGNOSIS — Z01.419 ENCOUNTER FOR GYNECOLOGICAL EXAMINATION WITHOUT ABNORMAL FINDING: Primary | ICD-10-CM

## 2021-07-09 PROCEDURE — 86762 RUBELLA ANTIBODY: CPT | Performed by: OBSTETRICS & GYNECOLOGY

## 2021-07-09 PROCEDURE — 36415 COLL VENOUS BLD VENIPUNCTURE: CPT | Performed by: OBSTETRICS & GYNECOLOGY

## 2021-07-09 PROCEDURE — 99395 PREV VISIT EST AGE 18-39: CPT | Performed by: OBSTETRICS & GYNECOLOGY

## 2021-07-09 RX ORDER — NAPROXEN 500 MG/1
500 TABLET ORAL PRN
COMMUNITY
End: 2024-07-19

## 2021-07-09 ASSESSMENT — MIFFLIN-ST. JEOR: SCORE: 1250.61

## 2021-07-09 NOTE — NURSING NOTE
Chief Complaint   Patient presents with     Gyn Exam     Annual        Initial /68 (BP Location: Right arm, Patient Position: Sitting, Cuff Size: Adult Regular)   Ht 1.524 m (5')   Wt 64.4 kg (142 lb)   LMP 2021 (Exact Date)   BMI 27.73 kg/m   Estimated body mass index is 27.73 kg/m  as calculated from the following:    Height as of this encounter: 1.524 m (5').    Weight as of this encounter: 64.4 kg (142 lb).  BP completed using cuff size: large    Questioned patient about current smoking habits.  Pt. has never smoked.          The following HM Due: NONE    Ousmane Brown CMA

## 2021-07-09 NOTE — PROGRESS NOTES
SUBJECTIVE:   CC: Louisa Peck is an 35 year old woman who presents for preventive health visit.       Patient has been advised of split billing requirements and indicates understanding: Yes  Healthy Habits:    Do you get at least three servings of calcium containing foods daily (dairy, green leafy vegetables, etc.)? yes    Amount of exercise or daily activities, outside of work: 7 day(s) per week    Problems taking medications regularly No    Medication side effects: No    Have you had an eye exam in the past two years? yes    Do you see a dentist twice per year? yes    Do you have sleep apnea, excessive snoring or daytime drowsiness?no    Not due for Pap/HPV until after 10/2021.  Is followed by [facility] to get IUI as she desires pregnancy and has a same-sex partner.  Wants to know if any other things should be checked.  Had normal glucose 11/2020 w/ Int Med.  Also has occasional HAs not associated with menses.      Today's PHQ-2 Score:   PHQ-2 ( 1999 [employer]) 11/4/2020 12/10/2019   Q1: Little interest or pleasure in doing things 1 1   Q2: Feeling down, depressed or hopeless 1 1   PHQ-2 Score 2 2   Q1: Little interest or pleasure in doing things - -   Q2: Feeling down, depressed or hopeless - -   PHQ-2 Score - -       Abuse: Current or Past(Physical, Sexual or Emotional)- No  Do you feel safe in your environment? Yes    Have you ever done Advance Care Planning? (For example, a Health Directive, POLST, or a discussion with a medical provider or your loved ones about your wishes): No, advance care planning information given to patient to review.  Patient plans to discuss their wishes with loved ones or provider.      Social History     Tobacco Use     Smoking status: Never Smoker     Smokeless tobacco: Never Used   Substance Use Topics     Alcohol use: Yes     Alcohol/week: 0.0 standard drinks     Comment: Rare     If you drink alcohol do you typically have >3 drinks per day or >7 drinks per week? No                      Reviewed orders with patient.  Reviewed health maintenance and updated orders accordingly - Yes  Current Outpatient Medications   Medication Sig Dispense Refill     naproxen (NAPROSYN) 500 MG tablet Take 500 mg by mouth as needed for headaches       omeprazole (PRILOSEC) 20 MG DR capsule Take 20 mg by mouth daily       polyethylene glycol (MIRALAX) 17 GM/Dose powder Take 17 g (1 capful) by mouth daily (Patient not taking: Reported on 2021)       No Known Allergies    FHS-7: No flowsheet data found.    Patient under 40 years of age: Routine Mammogram Screening not recommended.   Pertinent mammograms are reviewed under the imaging tab.    Pertinent mammograms are reviewed under the imaging tab.  History of abnormal Pap smear: NO - age 30- 65 PAP every 3 years recommended  PAP / HPV Latest Ref Rng & Units 10/23/2018   PAP - NIL   HPV 16 DNA NEG:Negative Negative   HPV 18 DNA NEG:Negative Negative   OTHER HR HPV NEG:Negative Negative     Reviewed and updated as needed this visit by clinical staff  Tobacco  Allergies  Meds   Med Hx  Surg Hx  Fam Hx  Soc Hx        Reviewed and updated as needed this visit by Provider                Past Medical History:   Diagnosis Date     Anemia      Thyroid nodule       Past Surgical History:   Procedure Laterality Date     ARTHROSCOPY KNEE WITH MEDIAL MENISCECTOMY Left      REMV/REVISN BOOT/BODY CAST Left      OB History    Para Term  AB Living   0 0 0 0 0 0   SAB TAB Ectopic Multiple Live Births   0 0 0 0 0       ROS:  CONSTITUTIONAL: NEGATIVE for fever, chills, change in weight  INTEGUMENTARU/SKIN: NEGATIVE for worrisome rashes, moles or lesions  EYES: NEGATIVE for vision changes or irritation  ENT: NEGATIVE for ear, mouth and throat problems  RESP: NEGATIVE for significant cough or SOB  BREAST: NEGATIVE for masses, tenderness or discharge  CV: NEGATIVE for chest pain, palpitations or peripheral edema  GI: NEGATIVE for nausea, abdominal pain,  heartburn, or change in bowel habits  : NEGATIVE for unusual urinary or vaginal symptoms. Periods are regular.  MUSCULOSKELETAL: NEGATIVE for significant arthralgias or myalgia  NEURO: NEGATIVE for weakness, dizziness or paresthesias  PSYCHIATRIC: NEGATIVE for changes in mood or affect    OBJECTIVE:   /68 (BP Location: Right arm, Patient Position: Sitting, Cuff Size: Adult Regular)   Ht 1.524 m (5')   Wt 64.4 kg (142 lb)   LMP 06/25/2021 (Exact Date)   BMI 27.73 kg/m    EXAM:  GENERAL: healthy, alert and no distress  EYES: Eyes grossly normal to inspection, PERRL and conjunctivae and sclerae normal  HENT: ear canals and TM's normal, nose and mouth without ulcers or lesions  NECK: no adenopathy, no asymmetry, masses, or scars and thyroid normal to palpation  RESP: lungs clear to auscultation - no rales, rhonchi or wheezes  BREAST: normal without masses, tenderness or nipple discharge and no palpable axillary masses or adenopathy  CV: regular rate and rhythm, normal S1 S2, no S3 or S4, no murmur, click or rub, no peripheral edema and peripheral pulses strong  ABDOMEN: soft, nontender, no hepatosplenomegaly, no masses and bowel sounds normal   (female): normal female external genitalia, normal urethral meatus, vaginal mucosa pink, moist, well rugated, and normal cervix/adnexa/uterus without masses or discharge  MS: no gross musculoskeletal defects noted, no edema  SKIN: no suspicious lesions or rashes  NEURO: Normal strength and tone, mentation intact and speech normal  PSYCH: mentation appears normal, affect normal/bright    Diagnostic Test Results:  none     ASSESSMENT/PLAN:       ICD-10-CM    1. Encounter for gynecological examination without abnormal finding  Z01.419    2. Rubella immune status not known  Z78.9 Rubella Antibody IgG Quantitative     Rubella titer today.  If non-immune, will arrange Rub Vax.    Pt encouraged to get COVID vaccine, ameena since she desires pregnancy soon.    Pt to f/u w/ [facility] for HAs.    Folic acid supp reviewed.  Patient has been advised of split billing requirements and indicates understanding: Yes  COUNSELING:   Reviewed preventive health counseling, as reflected in patient instructions  Special attention given to:        Folic Acid    Estimated body mass index is 27.73 kg/m  as calculated from the following:    Height as of this encounter: 1.524 m (5').    Weight as of this encounter: 64.4 kg (142 lb).        She reports that she has never smoked. She has never used smokeless tobacco.      Counseling Resources:  ATP IV Guidelines  Pooled Cohorts Equation Calculator  Breast Cancer Risk Calculator  BRCA-Related Cancer Risk Assessment: FHS-7 Tool  FRAX Risk Assessment  ICSI Preventive Guidelines  Dietary Guidelines for Americans, 2010  USDA's MyPlate  ASA Prophylaxis  Lung CA Screening    Gary Iniguez MD  [facility] [place]

## 2021-07-12 LAB — RUBV IGG SERPL IA-ACNC: 29 IU/ML

## 2021-07-22 ENCOUNTER — OFFICE VISIT (OUTPATIENT)
Dept: INTERNAL MEDICINE | Facility: CLINIC | Age: 36
End: 2021-07-22
Payer: COMMERCIAL

## 2021-07-22 VITALS
RESPIRATION RATE: 16 BRPM | DIASTOLIC BLOOD PRESSURE: 70 MMHG | BODY MASS INDEX: 28.07 KG/M2 | WEIGHT: 143 LBS | TEMPERATURE: 97.7 F | OXYGEN SATURATION: 99 % | SYSTOLIC BLOOD PRESSURE: 118 MMHG | HEIGHT: 60 IN | HEART RATE: 87 BPM

## 2021-07-22 DIAGNOSIS — L91.8 SKIN TAG: ICD-10-CM

## 2021-07-22 DIAGNOSIS — R51.9 NONINTRACTABLE HEADACHE, UNSPECIFIED CHRONICITY PATTERN, UNSPECIFIED HEADACHE TYPE: Primary | ICD-10-CM

## 2021-07-22 PROCEDURE — 99214 OFFICE O/P EST MOD 30 MIN: CPT | Performed by: INTERNAL MEDICINE

## 2021-07-22 RX ORDER — GABAPENTIN 300 MG/1
300 CAPSULE ORAL 3 TIMES DAILY
Qty: 90 CAPSULE | Refills: 0 | Status: CANCELLED | OUTPATIENT
Start: 2021-07-22

## 2021-07-22 RX ORDER — SUMATRIPTAN 50 MG/1
50 TABLET, FILM COATED ORAL
Qty: 10 TABLET | Refills: 0 | Status: SHIPPED | OUTPATIENT
Start: 2021-07-22 | End: 2021-08-06

## 2021-07-22 ASSESSMENT — MIFFLIN-ST. JEOR: SCORE: 1255.14

## 2021-07-22 NOTE — PROGRESS NOTES
Assessment & Plan     Nonintractable headache, unspecified chronicity pattern, unspecified headache type  Headaches sound like a migraine variant.  Patient has a known history of migraines dating back to a young age with similar symptoms now although less responsive to traditional treatment.  We discussed the issues in regards to migraine headaches and potentially abortive and preventative therapy.  The patient is concerned she may have a structural abnormality.  I reassured her that she does not demonstrate any focal changes on exam nor history but based on her concern we will proceed with an MRI scan for reassurance.  She is not interested in daily preventative therapy at this point.  We discussed the use of Imitrex.  We discussed its dosing as well as its side effects.  She would like to try.  I have advised her that I will inform her of the results of her MRI when available.    - MR Brain w/o Contrast; Future  - Adult Neurology Referral; Future  - SUMAtriptan (IMITREX) 50 MG tablet; Take 1 tablet (50 mg) by mouth at onset of headache for migraine May repeat in 2 hours. Max 4 tablets/24 hours.    Skin tag  Numerous skin tags noted right and left lateral neck.  Suggest that she may be best suited to be seen dermatology clinic for removal.     Work on weight loss  Regular exercise    Return for if symptoms recur or worsen, diagnostic test as ordered, follow-up Neurology for further assessment.    Carlos Marcos MD  [facility]    Ernestine Jasso is a 35 year old who presents for the following health issues  accompanied by her self:    Patient late for appointment but still seen.    HPI   Chief Complaint   Patient presents with     Headache     Wart       Headache:    Onset/Duration: 3 months  Description  Location: unilateral in the right occipital area   Character: squeezing pain  Frequency:  Constant   Duration:  All the time no relief  Wake with headaches: YES  Able to  do daily activities when headache present: no   Intensity:  mild, moderate  Progression of Symptoms:  same and constant  Accompanying signs and symptoms:  Stiff neck: no  Neck or upper back pain: no  Sinus or URI symptoms YES- a little    Fever: no  Nausea or vomiting: YES  Dizziness: YES  Numbness/tingling:  no  Weakness: YES- a couple time  Visual changes: DOTS  History  Head trauma: no  Family history of migraines: YES- self  Daily pain medication use: YES  Previous tests for headaches: no  Neurologist evaluation: no  Precipitating or Alleviating factors (light/sound/sleep/caffeine): Light   Therapies tried and outcome: Ibuprofen (Advil, Motrin) and Naproxyn (Aleve)              Outcome - not effective  Frequent/daily pain medication use: no      Patient was just seen by gynecology for routine evaluation on 7/9/21 with no acute changes noted on exam.  Subsequent subjective complaints of headaches were noted.  Patient does not take any medications on a chronic basis for headaches intermittent use of anti-inflammatories which normally take away her headache up until about 3 months ago.  There is documented history of migraines per patient age 12-13 in [place].   There is no focal red flag complaints. Review of the patient's prior clinic visits do not reference any primary visits for headache.  There has been no obvious trauma.    Patient states that when she was younger she was diagnosed with migraines.  Migraines tended to be unilateral and at times she notes nausea, upset stomach, light sensitivity and has sensation in the back of her scalp as well as visual dots.  These symptoms are similar to that which she has now.  Normally her symptoms tend to resolve with some anti-inflammatories that she takes but recently that has not been the case.  She reports the headaches occurring more frequently on a weekly basis and lasting longer.  She is in the process of trying to become pregnant through fertility  procedures.    Review of Systems   CONSTITUTIONAL: NEGATIVE for fever, chills, change in weight  EYES: NEGATIVE for vision changes or irritation  ENT/MOUTH: NEGATIVE for ear, mouth and throat problems  RESP: NEGATIVE for significant cough or SOB  CV: NEGATIVE for chest pain, palpitations or peripheral edema  GI: NEGATIVE for nausea, abdominal pain, heartburn, or change in bowel habits  : NEGATIVE for frequency, dysuria, or hematuria  HEME: NEGATIVE for bleeding problems  PSYCHIATRIC: NEGATIVE for changes in mood or affect      Objective    /70   Pulse 87   Temp 97.7  F (36.5  C) (Temporal)   Resp 16   Ht 1.524 m (5')   Wt 64.9 kg (143 lb)   LMP 06/25/2021 (Exact Date)   SpO2 99%   BMI 27.93 kg/m    Body mass index is 27.93 kg/m .  Physical Exam   GENERAL: alert and no distress  EYES: Eyes grossly normal to inspection, PERRL and conjunctivae and sclerae normal  HENT: ear canals and TM's normal, nose and mouth without ulcers or lesions  NECK: no adenopathy, no asymmetry, masses, or scars and thyroid normal to palpation  RESP: lungs clear to auscultation - no rales, rhonchi or wheezes  CV: regular rate and rhythm, normal S1 S2, no S3 or S4, no click or rub  MS: no gross musculoskeletal defects noted, no edema  NEURO: No focal changes, annual nerves II through XII are intact.  Speech is fluent.  Motor is grossly normal.  Gait is stable.  PSYCH: mentation appears normal, affect normal/bright  Skin tags noted upper lateral neck bilaterally

## 2021-08-04 ENCOUNTER — HOSPITAL ENCOUNTER (OUTPATIENT)
Dept: MRI IMAGING | Facility: CLINIC | Age: 36
Discharge: HOME | End: 2021-08-04
Attending: INTERNAL MEDICINE | Admitting: INTERNAL MEDICINE
Payer: COMMERCIAL

## 2021-08-04 DIAGNOSIS — R51.9 NONINTRACTABLE HEADACHE, UNSPECIFIED CHRONICITY PATTERN, UNSPECIFIED HEADACHE TYPE: ICD-10-CM

## 2021-08-04 PROCEDURE — 70551 MRI BRAIN STEM W/O DYE: CPT

## 2021-08-06 ENCOUNTER — MYC REFILL (OUTPATIENT)
Dept: INTERNAL MEDICINE | Facility: CLINIC | Age: 36
End: 2021-08-06

## 2021-08-06 DIAGNOSIS — R51.9 NONINTRACTABLE HEADACHE, UNSPECIFIED CHRONICITY PATTERN, UNSPECIFIED HEADACHE TYPE: ICD-10-CM

## 2021-08-06 RX ORDER — SUMATRIPTAN 50 MG/1
50 TABLET, FILM COATED ORAL
Qty: 10 TABLET | Refills: 0 | Status: SHIPPED | OUTPATIENT
Start: 2021-08-06 | End: 2023-05-08

## 2021-08-06 NOTE — TELEPHONE ENCOUNTER
Routing refill request to provider for review/approval because:  Drug not on the [facility] refill protocol   Ya Gore RN  [facility]=

## 2021-09-21 ENCOUNTER — TRANSFERRED RECORDS (OUTPATIENT)
Dept: HEALTH INFORMATION MANAGEMENT | Facility: CLINIC | Age: 36
End: 2021-09-21

## 2021-09-21 LAB
ALT SERPL-CCNC: 13 U/L (ref 6–29)
AST SERPL-CCNC: 16 U/L (ref 10–30)
CREATININE (EXTERNAL): 1.01 MG/DL (ref 0.5–1.1)
GFR ESTIMATED (EXTERNAL): 71 ML/MIN/1.73M2
GFR ESTIMATED (IF AFRICAN AMERICAN) (EXTERNAL): 82 ML/MIN/1.73M2
GLUCOSE (EXTERNAL): 82 MG/DL (ref 65–99)
POTASSIUM (EXTERNAL): 3.9 MMOL/L (ref 3.5–5.3)
TSH SERPL-ACNC: 2.86 UIU/ML (ref 0.27–4.2)

## 2021-09-29 ENCOUNTER — TRANSFERRED RECORDS (OUTPATIENT)
Dept: HEALTH INFORMATION MANAGEMENT | Facility: CLINIC | Age: 36
End: 2021-09-29

## 2021-09-30 ENCOUNTER — TRANSFERRED RECORDS (OUTPATIENT)
Dept: HEALTH INFORMATION MANAGEMENT | Facility: CLINIC | Age: 36
End: 2021-09-30

## 2021-10-09 ENCOUNTER — HEALTH MAINTENANCE LETTER (OUTPATIENT)
Age: 36
End: 2021-10-09

## 2022-03-15 ENCOUNTER — TRANSFERRED RECORDS (OUTPATIENT)
Dept: HEALTH INFORMATION MANAGEMENT | Facility: CLINIC | Age: 37
End: 2022-03-15

## 2022-03-24 ENCOUNTER — ANCILLARY PROCEDURE (OUTPATIENT)
Dept: GENERAL RADIOLOGY | Facility: CLINIC | Age: 37
End: 2022-03-24
Attending: NURSE PRACTITIONER
Payer: COMMERCIAL

## 2022-03-24 ENCOUNTER — OFFICE VISIT (OUTPATIENT)
Dept: PEDIATRICS | Facility: CLINIC | Age: 37
End: 2022-03-24
Payer: COMMERCIAL

## 2022-03-24 VITALS
OXYGEN SATURATION: 100 % | HEIGHT: 60 IN | DIASTOLIC BLOOD PRESSURE: 70 MMHG | SYSTOLIC BLOOD PRESSURE: 124 MMHG | WEIGHT: 140 LBS | RESPIRATION RATE: 16 BRPM | BODY MASS INDEX: 27.48 KG/M2 | TEMPERATURE: 97.6 F | HEART RATE: 89 BPM

## 2022-03-24 DIAGNOSIS — R11.0 NAUSEA: Primary | ICD-10-CM

## 2022-03-24 DIAGNOSIS — M54.50 CHRONIC BILATERAL LOW BACK PAIN WITHOUT SCIATICA: ICD-10-CM

## 2022-03-24 DIAGNOSIS — R10.2 PELVIC CRAMPING: ICD-10-CM

## 2022-03-24 DIAGNOSIS — G89.29 CHRONIC BILATERAL LOW BACK PAIN WITHOUT SCIATICA: ICD-10-CM

## 2022-03-24 DIAGNOSIS — G43.109 MIGRAINE WITH AURA AND WITHOUT STATUS MIGRAINOSUS, NOT INTRACTABLE: ICD-10-CM

## 2022-03-24 DIAGNOSIS — S99.921A INJURY OF RIGHT FOOT, INITIAL ENCOUNTER: ICD-10-CM

## 2022-03-24 DIAGNOSIS — N64.4 BREAST TENDERNESS IN FEMALE: ICD-10-CM

## 2022-03-24 LAB
CLUE CELLS: ABNORMAL
HCG UR QL: NEGATIVE
TRICHOMONAS, WET PREP: ABNORMAL
WBC'S/HIGH POWER FIELD, WET PREP: ABNORMAL
YEAST, WET PREP: ABNORMAL

## 2022-03-24 PROCEDURE — 99214 OFFICE O/P EST MOD 30 MIN: CPT | Performed by: NURSE PRACTITIONER

## 2022-03-24 PROCEDURE — 87210 SMEAR WET MOUNT SALINE/INK: CPT | Performed by: NURSE PRACTITIONER

## 2022-03-24 PROCEDURE — 73630 X-RAY EXAM OF FOOT: CPT | Mod: RT | Performed by: RADIOLOGY

## 2022-03-24 PROCEDURE — 81025 URINE PREGNANCY TEST: CPT | Performed by: NURSE PRACTITIONER

## 2022-03-24 NOTE — PROGRESS NOTES
Assessment & Plan     Nausea  Pelvic cramping  Breast tenderness in female  Neg pregnancy test. Possibly symptoms related to ovulation. Also possibly some constipation but this wouldn't explain the breast tenderness-will trial miralax clean out.  Reassuring no severe pain, fever, vomiting. We discussed emergent s/s.  - HCG Qual, Urine ([id number]); Future  - Wet prep - lab collect; Future  - HCG Qual, Urine ([id number])  - Wet prep - lab collect    Migraine with aura and without status migrainosus, not intractable  C/w her typical migraines, no new or red flag symptoms.    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    Injury of right foot, initial encounter  Normal x-ray. Recommend she see podiatry if persisting-she will let me know if she would like referral.  - XR Foot Right G/E 3 Views; Future      Patient Instructions   If you have worsening pain, fever, or heavy bleeding you should be seen right away  Take another pregnancy test if no period with next predicted cycle  You can try miralax 1 cap 1-2 per day to see if bowel clear out helps your symptoms      Return in about 1 week (around 3/31/2022), or if symptoms worsen or fail to improve.    Roseann Caraballo NP  [facility] SHAMIKA Jasso is a 36 year old who presents for the following health issues     HPI     Acute Illness  Acute illness concerns: back pain, nausea, headaches  Would like a blood pregnancy test due to SX, also having foot pain x few months  Onset/Duration: 2 weeks  Symptoms:   Fever: no  Chills/Sweats: no  Headache (location?): YES  Sinus Pressure: no  Conjunctivitis:  no  Ear Pain: no  Rhinorrhea: no  Congestion: no  Sore Throat: no  Cough: no  Wheeze: no  Decreased Appetite: no  Nausea: YES  Vomiting: no  Diarrhea: no  Dysuria/Freq.: no  Dysuria or Hematuria: no  Fatigue/Achiness: no  Sick/Strep Exposure: no  Therapies tried and outcome: None    iui  on 2/23  Stopped fertility meds 2 weeks ago  2 weeks ago onset of slght nausea, b/l breast sensitive, slight cramping, slight back pain (works construction)  Some contipation a couple weeks ago  Migraine a couple days ago now resolved   Denies vomiting   Had vaginal bleeding 3/13 for 5 days, light  Home pregnancy test 1 week ago negative    Hit her right toe  Hit with a metal bed frame 2 mos ago  Continues to bother her with walking  Right great toe and 2nd toe      Review of Systems   Otherwise ROS is negative except as stated above.        Objective    /70   Pulse 89   Temp 97.6  F (36.4  C)   Resp 16   Ht 1.524 m (5')   Wt 63.5 kg (140 lb)   LMP 03/13/2022   SpO2 100%   BMI 27.34 kg/m    Body mass index is 27.34 kg/m .  Physical Exam   GENERAL: healthy, alert and no distress  RESP: lungs clear to auscultation - no rales, rhonchi or wheezes  CV: regular rate and rhythm, normal S1 S2, no S3 or S4, no murmur, click or rub, no peripheral edema and peripheral pulses strong  ABDOMEN: soft, nontender, no hepatosplenomegaly, no masses and bowel sounds normal  MS: right foot and toes without obvious deformity, swelling, bruising. Right great toe and 2nd toe tender to palpation diffusely and pain radiates mid foot    Results for orders placed or performed in visit on 03/24/22 (from the past 24 hour(s))   HCG Qual, Urine ([id number])   Result Value Ref Range    hCG Urine Qualitative Negative Negative   Wet prep - lab collect    Specimen: Vagina; Swab   Result Value Ref Range    Trichomonas Absent Absent    Yeast Absent Absent    Clue Cells Absent Absent    WBCs/high power field 2+ (A) None     XR Foot Right G/E 3 Views    Result Date: 3/24/2022  EXAM: XR FOOT RIGHT G/E 3 VIEWS LOCATION: [facility] DATE/TIME: 3/24/2022 5:11 PM INDICATION: Injury of right foot, initial encounter; pain COMPARISON: None.     IMPRESSION: Normal joint spaces and alignment. No fracture.

## 2022-03-24 NOTE — PATIENT INSTRUCTIONS
If you have worsening pain, fever, or heavy bleeding you should be seen right away  Take another pregnancy test if no period with next predicted cycle  You can try miralax 1 cap 1-2 per day to see if bowel clear out helps your symptoms

## 2022-03-25 ENCOUNTER — MYC MEDICAL ADVICE (OUTPATIENT)
Dept: PEDIATRICS | Facility: CLINIC | Age: 37
End: 2022-03-25
Payer: COMMERCIAL

## 2022-03-25 DIAGNOSIS — S99.921A INJURY OF RIGHT FOOT, INITIAL ENCOUNTER: Primary | ICD-10-CM

## 2022-03-31 ENCOUNTER — OFFICE VISIT (OUTPATIENT)
Dept: PODIATRY | Facility: CLINIC | Age: 37
End: 2022-03-31
Attending: NURSE PRACTITIONER
Payer: COMMERCIAL

## 2022-03-31 VITALS
SYSTOLIC BLOOD PRESSURE: 120 MMHG | BODY MASS INDEX: 27.48 KG/M2 | WEIGHT: 140 LBS | HEIGHT: 60 IN | DIASTOLIC BLOOD PRESSURE: 72 MMHG

## 2022-03-31 DIAGNOSIS — S99.921A INJURY OF RIGHT FOOT, INITIAL ENCOUNTER: ICD-10-CM

## 2022-03-31 PROCEDURE — 99244 OFF/OP CNSLTJ NEW/EST MOD 40: CPT | Performed by: PODIATRIST

## 2022-03-31 NOTE — LETTER
"    3/31/2022         RE: Louisa Peck  [address] [facility] [address]        Dear Colleague,    Thank you for referring your patient, Louisa Peck, to the [facility]. Please see a copy of my visit note below.    Foot & Ankle Surgery  March 31, 2022    CC: \" Right foot pain\"    I was asked to see Louisa Peck regarding the chief complaint by:  JAMES Caraballo    HPI:  Pt is a 36 year old female who presents with above complaint.  Multiple month history of bilateral lower extremity pain.  Describes aching and throbbing, pain 6 out of 10 \"half a day\", worse with \"walking a lot or kneeling\".  \"Ice, Tylenol\" for treatment.  \"Bunions are kind of bothering me\" bilateral.  She struck toes #1 and 2 in the right foot approximately 2 months ago, they are better.  Instant arches of the main causes and main areas of pain x-rays with Roseann Caraballo on 3/24/2022 were read as unremarkable.      ROS:   Pos for CC.  The patient denies current nausea, vomiting, chills, fevers, belly pain, calf pain, chest pain or SOB.  Complete remainder of ROS is otherwise neg.    VITALS:  There were no vitals filed for this visit.    PMH:    Past Medical History:   Diagnosis Date     Anemia      Thyroid nodule        SXHX:    Past Surgical History:   Procedure Laterality Date     ARTHROSCOPY KNEE WITH MEDIAL MENISCECTOMY Left      REMV/REVISN BOOT/BODY CAST Left         MEDS:    Current Outpatient Medications   Medication     naproxen (NAPROSYN) 500 MG tablet     omeprazole (PRILOSEC) 20 MG DR capsule     SUMAtriptan (IMITREX) 50 MG tablet     No current facility-administered medications for this visit.       ALL:   No Known Allergies    FMH:    Family History   Problem Relation Age of Onset     Diabetes Father        SocHx:    Social History     Socioeconomic History     Marital status:      Spouse name: Not on file     Number of children: Not on file     Years of " education: Not on file     Highest education level: Not on file   Occupational History     Not on file   Tobacco Use     Smoking status: Never Smoker     Smokeless tobacco: Never Used   Substance and Sexual Activity     Alcohol use: Yes     Alcohol/week: 0.0 standard drinks     Comment: Rare     Drug use: No     Sexual activity: Yes     Partners: Female     Birth control/protection: None   Other Topics Concern     Parent/sibling w/ CABG, MI or angioplasty before 65F 55M? Not Asked   Social History Narrative     Not on file     Social Determinants of Health     Financial Resource Strain: Not on file   Food Insecurity: Not on file   Transportation Needs: Not on file   Physical Activity: Not on file   Stress: Not on file   Social Connections: Not on file   Intimate Partner Violence: Not on file   Housing Stability: Not on file           EXAMINATION:  Gen:   No apparent distress  Neuro:   A&Ox3, no deficits  Psych:    Answering questions appropriately for age and situation with normal affect  Head:    NCAT  Eye:    Visual scanning without deficit  Ear:    Response to auditory stimuli wnl  Lung:    Non-labored breathing on RA noted  Abd:    NTND per patient report  Lymph:    Neg for pitting/non-pitting edema BLE  Vasc:    Pulses palpable, CFT minimally delayed  Neuro:    Light touch sensation intact to all sensory nerve distributions without paresthesias  Derm:    Neg for nodules, lesions or ulcerations  MSK:     Right lower extremity -tenderness along the central band of the plantar fascia.  Mild pain along the distal abductor hallucis muscle belly.  Clinical bunion, mildly tender with pressure of the bump.  She does have some discomfort at the right first MPJ and worse pain/crepitus with passive range of motion of the right hallux IPJ from the previous contusion.  Minimal second toe pain noted today.  Left lower extremity -minimal arch pain.  Bunion noted, partially reducible, minimal joint pain or crepitus  noted.  Calf:    Neg for redness, swelling or tenderness    Assessment:  36 year old female with previous contusion of the right first and second toes with continued right hallux IPJ and right first MPJ pain; right greater than left arch pain; bilateral bunion      Plan:  Discussed etiologies, anatomy and options  1.  Previous contusion of the right first and second toes with continued right hallux IPJ and right first MPJ pain  -I personally reviewed and interpreted the patient's lower extremity history pertinent to today's visit, including imaging/labs, in preparation for initiating a treatment program.  -Comfortable shoes; minimize shoeless walking  -RICE/NSAID versus Tylenol as needed based on pain  -Consider x-ray guided right hallux IPJ and/or right first MPJ steroid injections versus MRI of the foot if the above plan fails to alleviate symptoms sufficiently    2.  Right greater than left arch pain including pain along the central band of the plantar fascia  -Regarding the heel pain, the Plantar Fasciitis handout was dispensed and discussed.  We talked about stretching, resting/activity modification, icing, NSAID/tylenol use as tolerated, inserts, supportive/comfortable shoes and minimizing shoeless walking.    -discussed Achilles, plantar fascial and hamstring stretches  -OTC insert information dispensed and discussed  -Consider orthotics, boot, physical therapy    3.  Bilateral bunion  -Comfortable accommodative shoes with adequately wide toe box  -We discussed options for bunion pads and spacers  -RICE/NSAID versus Tylenol.  Based on pain  -We discussed that if the above plan fails to provide sufficient relief, and symptoms are having a negative impact on quality of life, surgical intervention will be the next step.    Follow up: 4 weeks for arch pain assessment or sooner with acute issues      Patient's medical history was reviewed today      Bright Jovel DPM FACFAS FACFAOM  Podiatric Foot & Ankle  Surgeon  [facility]  [phone]    Disclaimer: This note consists of symbols derived from keyboarding, dictation and/or voice recognition software. As a result, there may be errors in the script that have gone undetected. Please consider this when interpreting information found in this chart.            Again, thank you for allowing me to participate in the care of your patient.        Sincerely,        Bright Jovel DPM, TACO

## 2022-03-31 NOTE — PROGRESS NOTES
"Foot & Ankle Surgery  March 31, 2022    CC: \" Right foot pain\"    I was asked to see Louisa Peck regarding the chief complaint by:  JAMES Caraballo    HPI:  Pt is a 36 year old female who presents with above complaint.  Multiple month history of bilateral lower extremity pain.  Describes aching and throbbing, pain 6 out of 10 \"half a day\", worse with \"walking a lot or kneeling\".  \"Ice, Tylenol\" for treatment.  \"Bunions are kind of bothering me\" bilateral.  She struck toes #1 and 2 in the right foot approximately 2 months ago, they are better.  Instant arches of the main causes and main areas of pain x-rays with Roseann Caraballo on 3/24/2022 were read as unremarkable.      ROS:   Pos for CC.  The patient denies current nausea, vomiting, chills, fevers, belly pain, calf pain, chest pain or SOB.  Complete remainder of ROS is otherwise neg.    VITALS:  There were no vitals filed for this visit.    PMH:    Past Medical History:   Diagnosis Date     Anemia      Thyroid nodule        SXHX:    Past Surgical History:   Procedure Laterality Date     ARTHROSCOPY KNEE WITH MEDIAL MENISCECTOMY Left      REMV/REVISN BOOT/BODY CAST Left         MEDS:    Current Outpatient Medications   Medication     naproxen (NAPROSYN) 500 MG tablet     omeprazole (PRILOSEC) 20 MG DR capsule     SUMAtriptan (IMITREX) 50 MG tablet     No current facility-administered medications for this visit.       ALL:   No Known Allergies    FMH:    Family History   Problem Relation Age of Onset     Diabetes Father        SocHx:    Social History     Socioeconomic History     Marital status:      Spouse name: Not on file     Number of children: Not on file     Years of education: Not on file     Highest education level: Not on file   Occupational History     Not on file   Tobacco Use     Smoking status: Never Smoker     Smokeless tobacco: Never Used   Substance and Sexual Activity     Alcohol use: Yes     Alcohol/week: 0.0 standard drinks     " Comment: Rare     Drug use: No     Sexual activity: Yes     Partners: Female     Birth control/protection: None   Other Topics Concern     Parent/sibling w/ CABG, MI or angioplasty before 65F 55M? Not Asked   Social History Narrative     Not on file     Social Determinants of Health     Financial Resource Strain: Not on file   Food Insecurity: Not on file   Transportation Needs: Not on file   Physical Activity: Not on file   Stress: Not on file   Social Connections: Not on file   Intimate Partner Violence: Not on file   Housing Stability: Not on file           EXAMINATION:  Gen:   No apparent distress  Neuro:   A&Ox3, no deficits  Psych:    Answering questions appropriately for age and situation with normal affect  Head:    NCAT  Eye:    Visual scanning without deficit  Ear:    Response to auditory stimuli wnl  Lung:    Non-labored breathing on RA noted  Abd:    NTND per patient report  Lymph:    Neg for pitting/non-pitting edema BLE  Vasc:    Pulses palpable, CFT minimally delayed  Neuro:    Light touch sensation intact to all sensory nerve distributions without paresthesias  Derm:    Neg for nodules, lesions or ulcerations  MSK:     Right lower extremity -tenderness along the central band of the plantar fascia.  Mild pain along the distal abductor hallucis muscle belly.  Clinical bunion, mildly tender with pressure of the bump.  She does have some discomfort at the right first MPJ and worse pain/crepitus with passive range of motion of the right hallux IPJ from the previous contusion.  Minimal second toe pain noted today.  Left lower extremity -minimal arch pain.  Bunion noted, partially reducible, minimal joint pain or crepitus noted.  Calf:    Neg for redness, swelling or tenderness    Assessment:  36 year old female with previous contusion of the right first and second toes with continued right hallux IPJ and right first MPJ pain; right greater than left arch pain; bilateral bunion      Plan:  Discussed  etiologies, anatomy and options  1.  Previous contusion of the right first and second toes with continued right hallux IPJ and right first MPJ pain  -I personally reviewed and interpreted the patient's lower extremity history pertinent to today's visit, including imaging/labs, in preparation for initiating a treatment program.  -Comfortable shoes; minimize shoeless walking  -RICE/NSAID versus Tylenol as needed based on pain  -Consider x-ray guided right hallux IPJ and/or right first MPJ steroid injections versus MRI of the foot if the above plan fails to alleviate symptoms sufficiently    2.  Right greater than left arch pain including pain along the central band of the plantar fascia  -Regarding the heel pain, the Plantar Fasciitis handout was dispensed and discussed.  We talked about stretching, resting/activity modification, icing, NSAID/tylenol use as tolerated, inserts, supportive/comfortable shoes and minimizing shoeless walking.    -discussed Achilles, plantar fascial and hamstring stretches  -OTC insert information dispensed and discussed  -Consider orthotics, boot, physical therapy    3.  Bilateral bunion  -Comfortable accommodative shoes with adequately wide toe box  -We discussed options for bunion pads and spacers  -RICE/NSAID versus Tylenol.  Based on pain  -We discussed that if the above plan fails to provide sufficient relief, and symptoms are having a negative impact on quality of life, surgical intervention will be the next step.    Follow up: 4 weeks for arch pain assessment or sooner with acute issues      Patient's medical history was reviewed today      Bright Jovel DPM FACFAS FACFAOM  Podiatric Foot & Ankle Surgeon  [facility]  [phone]    Disclaimer: This note consists of symbols derived from keyboarding, dictation and/or voice recognition software. As a result, there may be errors in the script that have gone undetected. Please consider this when interpreting  information found in this chart.

## 2022-03-31 NOTE — PATIENT INSTRUCTIONS
Thank you for choosing [facility] / Foot & Ankle Surgery!    DR SEXTON'S CLINIC:  [facility]   [address]   [place] [address]      TRIAGE LINE: [phone] - Opt. 4  APPOINTMENTS: [phone]  RADIOLOGY: [phone]  SET UP SURGERY: [phone]  BILLING QUESTIONS: [phone]  FAX: [phone]         Follow up: 4 weeks      PLANTAR FASCIITIS  Plantar fasciitis is often referred to as heel spurs or heel pain. Plantar fasciitis is a very common problem that affects people of all foot shapes, age, weight and activity level. Pain may be in the arch or on the weight-bearing surface of the heel. The pain may come on without injury or identifiable cause. Pain is generally present when first getting out of bed in the morning or up from a seated break.     CAUSES  The plantar fascia is a dense fibrous band of tissue that stretches across the bottom surface of the foot. The fascia helps support the foot muscles and arch. Plantar fasciitis is thought to be caused by mechanical strain or overload. Frequent walking without shoes or wearing unsupportive shoes is thought to cause structural overload and ultimately inflammation of the plantar fascia. Some people have heel spurs that can be seen on x-ray. The heel spur is actually a minor component of plantar fascitis and is largely ignored.       SELF TREATMENT   The easiest solution is to stop walking around your home without shoes. Plantar fasciitis is largely a shoe problem. Shoes are either not being worn often enough or your current shoes are inadequate for your weight, foot structure or activity level. The majority of shoes on the market today are not sufficient to resist development of plantar fasciitis or to promote healing. Assume that your current shoes are inadequate and will need to be replaced. Even high quality shoes wear out with 6 months to one year of frequent use. Weight loss is another option. Losing  ten pounds in the next two months may be enough to resolve the problem. Ice applied to the area of pain two to three times per day for ten minutes each session can be very helpful. Warm foot soaks in epsom salts can also relieve pain. This should continue until the problem resolves. Achilles tendon stretching is essential. Stretch multiple times daily to promote healing and to prevent recurrence in the future. Over all stretching of the body is helpful as well such as the calves, thighs and lower back. Normally when one area of the body is tight, other areas are too. Gentle Yoga can be good for this.     Over the counter topical anti inflammatories can be helpful such as biofreeze, bengay, salon pas, ect...  Oral ibuprofen or aleve is recommended as well to try to calm down inflammation.     Night splints can be helpful to gradually stretch the foot at night as a lot of pain is when you get up in the morning. Taking a towel or thera band and stretching the foot back multiple times before you get ou of bed can be beneficial as well.     MEDICAL TREATMENT  Medical treatments often include custom arch supports, cortisone injections, physical therapy, splints to be worn in bed, prescription medications and surgery. The home treatments listed above will be necessary regardless of these advanced medical treatments. Surgery is rarely needed but is very helpful in selected cases.     PROGNOSIS  Plantar fasciitis can last from one day to a lifetime. Some people get intermittent fascitis that is very short-lived. Others suffer daily for years. Excessive body weight, frequent bare foot walking, long hours on the feet, inadequate shoes, predisposing foot structures and excessive activity such as running are all potential issues that lead to chronic and/or recurring plantar fascitis. Having plantar fasciitis means that you are forever prone to this problem and will require modification of some of the above factors. Most people  seek treatment within one to four months. Healing usually requires a similar one to four month time frame. Healing time is relative to the amount of effort spent treating the problem.   Plantar fasciitis is highly recurrent. Risk factors often continue, including return to bare foot walking, inadequate shoes, excessive body weight, excessive activities, etc. Your life style and foot structure may predispose you to recurrent plantar fasciitis. A daily prevention regimen can be very helpful. Ongoing use of shoe inserts, careful attention to appropriate shoes, daily Achilles stretching, etc. may prevent recurrence. Prompt attention at the earliest warning signs of heel pain can resolve the problem in as short as a few days.     EXERCISES  Stair Exercise: Step on the stairs with the ball of your foot and hold your position for at least 15 seconds, then slowly step down with the heels of your foot. You can do this daily and as often as you want.   Picking the Towel: Sit comfortably and then pick the towel up with your toes. You can use any object other than a towel as long as the material can be soft and you can pick it up with your toes.  Rolling the Bottle: Use a small ball or frozen water bottle and then roll it around with your foot.   Flex the Toes: Sit comfortably and then flex your toes by pointing it towards the floor or towards your body. This will relax and flex your foot and exercise your plantar fascia, the calf, and the Achilles tendon. The inability of the foot to stretch often causes the bunching up of the plantar fascia area leading to the pain.  Calf/Achilles Stretching: Lay on you back and raise one foot, then point your toes towards the floor. See photo below:               Hold each stretch for 10 seconds. Stretch 10 times per set, three sets per day. Morning, afternoon and evening. If your heel pain is very severe in the morning, consider doing the first set of stretches before you get out of  bed.      OVER THE COUNTER INSERT RECOMMENDATIONS  SuperFeet   Sofsole Fit Spenco   Power Step   Walk-Fit Arch Cradles     Most of these can be found at your local [employer], sporting [employer] stores, or online.  **A good high quality over the counter insert should cost around $40-$50      [employer] LOCATIONS  [place]  [address] [facility]  [phone]   [address]  [phone] [place]  [address] [facility]  [phone]   [place]  [address] [facility] [address]  [phone]   [place]  [address]  [phone] [place]  [address] [place] [address] [place]  [phone]   [place]  [address]  [phone]   [place]  [address]  [phone] [place]  [address]  [phone]       PRICE THERAPY  Many aches and pains throughout the foot and ankle can be helped with many simple treatments. This is usually described as PRICE Therapy.      P - Protection - often times, inflammation/pain in the lower extremity is not able to improve simply because the areas involved are never allowed to rest. Every step we take can bother the problematic area. Protecting those areas is an important step in the healing process. This may involve a walking cast boot, a special insert/orthotic device, an ankle brace, or simply avoiding barefoot walking.    R - Rest - in addition to protecting the foot/ankle, resting is an important, but often times difficult, treatment option. Getting off your feet when they bother you, and specifically avoiding activities that cause pain/discomfort, are very beneficial to prevent, and treat, foot/ankle pain.      I - Ice - icing regularly can help to decrease inflammation and swelling in the foot, thus decreasing pain. Using an ice pack or a bag of frozen veggies works very well. Ice for 20 minutes multiple times per day as needed.  Do not place the ice directly on the skin as this can cause tissue damage.    C - Compression - using a  compression wrap or an ACE wrap can help to decrease swelling, which can help to decrease pain. Wearing the wraps is generally not needed at night, but they should be worn on a regular basis when you are going to be on your feet for prolonged periods as gravity tends to pull fluids down to your feet/ankles.    E - Elevation - elevating your lower extremities multiple times daily for 15-20 minutes can help to decrease swelling, which works well in decreasing pain levels.    NSAID/Tylenol - Anti-inflammatories like Aleve or ibuprofen, and/or a pain medication, such as Tylenol, can help to improve pain levels and get the issue resolved sooner rather than later. Anyone with liver issues should be careful with Tylenol, and anyone with high blood pressure or heart, stomach or kidney issues should be careful with anti-inflammatories. Please ask if you have questions about these medications, including dosage.

## 2022-09-11 ENCOUNTER — HEALTH MAINTENANCE LETTER (OUTPATIENT)
Age: 37
End: 2022-09-11

## 2022-10-19 ENCOUNTER — OFFICE VISIT (OUTPATIENT)
Dept: MIDWIFE SERVICES | Facility: CLINIC | Age: 37
End: 2022-10-19
Payer: COMMERCIAL

## 2022-10-19 VITALS
SYSTOLIC BLOOD PRESSURE: 112 MMHG | BODY MASS INDEX: 28.66 KG/M2 | HEART RATE: 84 BPM | DIASTOLIC BLOOD PRESSURE: 70 MMHG | WEIGHT: 146 LBS | HEIGHT: 60 IN

## 2022-10-19 DIAGNOSIS — Z13.29 SCREENING FOR THYROID DISORDER: ICD-10-CM

## 2022-10-19 DIAGNOSIS — N97.9 FEMALE INFERTILITY: ICD-10-CM

## 2022-10-19 DIAGNOSIS — Z13.220 LIPID SCREENING: ICD-10-CM

## 2022-10-19 DIAGNOSIS — Z62.810 HISTORY OF SEXUAL ABUSE IN CHILDHOOD: ICD-10-CM

## 2022-10-19 DIAGNOSIS — Z01.419 ENCOUNTER FOR GYNECOLOGICAL EXAMINATION WITHOUT ABNORMAL FINDING: Primary | ICD-10-CM

## 2022-10-19 DIAGNOSIS — Z78.9 ATTEMPTING TO CONCEIVE: ICD-10-CM

## 2022-10-19 PROCEDURE — 36415 COLL VENOUS BLD VENIPUNCTURE: CPT | Performed by: REGISTERED NURSE

## 2022-10-19 PROCEDURE — 84443 ASSAY THYROID STIM HORMONE: CPT | Performed by: REGISTERED NURSE

## 2022-10-19 PROCEDURE — 80061 LIPID PANEL: CPT | Performed by: REGISTERED NURSE

## 2022-10-19 PROCEDURE — 84702 CHORIONIC GONADOTROPIN TEST: CPT | Performed by: REGISTERED NURSE

## 2022-10-19 PROCEDURE — 99395 PREV VISIT EST AGE 18-39: CPT | Performed by: REGISTERED NURSE

## 2022-10-19 RX ORDER — PRENATAL VIT/IRON FUM/FOLIC AC 27MG-0.8MG
1 TABLET ORAL DAILY
COMMUNITY
End: 2024-07-19
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
## 2022-10-19 NOTE — NURSING NOTE
Chief Complaint   Patient presents with     Physical       Initial /70   Pulse 84   Ht 1.524 m (5')   Wt 66.2 kg (146 lb)   LMP 10/19/2022 (Exact Date)   BMI 28.51 kg/m   Estimated body mass index is 28.51 kg/m  as calculated from the following:    Height as of this encounter: 1.524 m (5').    Weight as of this encounter: 66.2 kg (146 lb).  BP completed using cuff size: regular    Questioned patient about current smoking habits.  Pt. has never smoked.          The following HM Due: NONE      The following patient reported/Care Every where data was sent to:  P ABSTRACT QUALITY INITIATIVES [[id number]]  Christina Gonzalez LPN

## 2022-10-20 LAB
B-HCG SERPL-ACNC: <1 IU/L (ref 0–5)
CHOLEST SERPL-MCNC: 229 MG/DL
FASTING STATUS PATIENT QL REPORTED: YES
HDLC SERPL-MCNC: 84 MG/DL
LDLC SERPL CALC-MCNC: 126 MG/DL
NONHDLC SERPL-MCNC: 145 MG/DL
TRIGL SERPL-MCNC: 94 MG/DL
TSH SERPL DL<=0.005 MIU/L-ACNC: 2.04 MU/L (ref 0.4–4)

## 2022-10-31 ENCOUNTER — TELEPHONE (OUTPATIENT)
Dept: BEHAVIORAL HEALTH | Facility: CLINIC | Age: 37
End: 2022-10-31

## 2022-10-31 NOTE — TELEPHONE ENCOUNTER
Writer spoke with pt and scheduled initial TC therapy appointment on 11/07/22 @ 3:30 pm. Writer sent intake documents via In[employer]. Writer will reply to referral source.Tracker completed.    Evelyn Damon  10/31/22  400
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
## 2022-11-02 ENCOUNTER — TELEPHONE (OUTPATIENT)
Dept: BEHAVIORAL HEALTH | Facility: CLINIC | Age: 37
End: 2022-11-02

## 2022-11-02 NOTE — TELEPHONE ENCOUNTER
Writer left patient a voicemail to reschedule appointment on 11/07/22 @ 3:30 pm and move it to Thursday at 11/10/2022 @ 3 and 330 pm.     Evelyn Damon  11/02/22  122

## 2022-11-05 ENCOUNTER — TELEPHONE (OUTPATIENT)
Dept: BEHAVIORAL HEALTH | Facility: CLINIC | Age: 37
End: 2022-11-05

## 2022-11-05 NOTE — TELEPHONE ENCOUNTER
Writer left  Message re: switching in person visit to virtual or telephone visit for TC appt on Monday 11.7.22 at 330pm.    SIMIN'Soniya MarteAdriaShriners [facility]  Care Coordinator  11.5

## 2022-11-07 ENCOUNTER — VIRTUAL VISIT (OUTPATIENT)
Dept: BEHAVIORAL HEALTH | Facility: CLINIC | Age: 37
End: 2022-11-07
Payer: COMMERCIAL

## 2022-11-07 DIAGNOSIS — F43.10 POSTTRAUMATIC STRESS DISORDER: ICD-10-CM

## 2022-11-07 DIAGNOSIS — F32.9 MAJOR DEPRESSIVE DISORDER, SINGLE EPISODE, UNSPECIFIED: Primary | ICD-10-CM

## 2022-11-07 DIAGNOSIS — F41.1 GENERALIZED ANXIETY DISORDER: ICD-10-CM
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
## 2022-11-14 ENCOUNTER — TELEPHONE (OUTPATIENT)
Dept: BEHAVIORAL HEALTH | Facility: CLINIC | Age: 37
End: 2022-11-14

## 2022-11-14 NOTE — TELEPHONE ENCOUNTER
Writer left patient a voicemail to reschedule in person appointment as weather has not permitted provider to go into hub. Tracker completed.    Evelyn Damon  11/14/22  207

## 2023-02-06 ENCOUNTER — OFFICE VISIT (OUTPATIENT)
Dept: OBGYN | Facility: CLINIC | Age: 38
End: 2023-02-06
Payer: COMMERCIAL

## 2023-02-06 VITALS
BODY MASS INDEX: 29.45 KG/M2 | SYSTOLIC BLOOD PRESSURE: 122 MMHG | WEIGHT: 150 LBS | HEIGHT: 60 IN | DIASTOLIC BLOOD PRESSURE: 80 MMHG

## 2023-02-06 DIAGNOSIS — Z31.41 FERTILITY TESTING: Primary | ICD-10-CM

## 2023-02-06 DIAGNOSIS — N97.1 TUBAL OCCLUSION: ICD-10-CM

## 2023-02-06 PROCEDURE — 99214 OFFICE O/P EST MOD 30 MIN: CPT | Performed by: OBSTETRICS & GYNECOLOGY

## 2023-02-06 NOTE — PATIENT INSTRUCTIONS
"Ongoing fertility evaluation:    - could repeat AMH to evaluate ovulatory reserve  - plan pelvic ultrasound to evaluate for hydrosalpinx  - consider \"diagnostic laparoscopy, tubal dye study, and unilateral salpingectomy\" (removal of tube) of the side that is blocked  - obtain records from [facility]   - can repeat labs at any time     "

## 2023-02-06 NOTE — NURSING NOTE
Chief Complaint   Patient presents with     Fertility     Has attempted 3 IUIs with [facility] and 3 at home       Initial /80 (BP Location: Right arm, Patient Position: Chair, Cuff Size: Adult Regular)   Ht 1.524 m (5')   Wt 68 kg (150 lb)   LMP 01/15/2023 (Exact Date)   Breastfeeding No   BMI 29.29 kg/m   Estimated body mass index is 29.29 kg/m  as calculated from the following:    Height as of this encounter: 1.524 m (5').    Weight as of this encounter: 68 kg (150 lb).  BP completed using cuff size: regular    Questioned patient about current smoking habits.  Pt. has never smoked.          The following HM Due: NONE    Maine Dave CMA

## 2023-02-06 NOTE — PROGRESS NOTES
SUBJECTIVE:   CC: fertility                                               Louisa Peck is a 37 year old  female who presents to clinic today to discuss next steps with regards to fertility. She is in a same sex relationship and using a sperm donor. She has undergone 3 rounds of IUI from 2021 to 2022 with Dr. Aletha Shetty of [facility] unsuccessfully, all with frozen sperm. She has tried home insemination with fresh specimen x3, donor has fathered other children. Hx HSG with occlusion of 1 tube per her report. She reports otherwise normal testing, good antral follicle count, ovulates regularly, regular monthly cycles. Tracks ovulation with test strips, ovulates on day 12-14, cycles usually 29 days. Recalls normal AMH during fertility evaluation.    Her female partner is interested in carrying a pregnancy as well, but they planned to wait until Louisa conceived because she was older.     Problem list and histories reviewed & adjusted, as indicated.  Additional history: as documented.    naproxen (NAPROSYN) 500 MG tablet, Take 500 mg by mouth as needed for headaches  omeprazole (PRILOSEC) 20 MG DR capsule, Take 20 mg by mouth daily (Patient not taking: Reported on 2023)  Prenatal Vit-Fe Fumarate-FA (PRENATAL MULTIVITAMIN W/IRON) 27-0.8 MG tablet, Take 1 tablet by mouth daily  SUMAtriptan (IMITREX) 50 MG tablet, Take 1 tablet (50 mg) by mouth at onset of headache for migraine May repeat in 2 hours. Max 4 tablets/24 hours. (Patient not taking: Reported on 2023)    No current facility-administered medications on file prior to visit.    No Known Allergies    OBJECTIVE:   /80 (BP Location: Right arm, Patient Position: Chair, Cuff Size: Adult Regular)   Ht 1.524 m (5')   Wt 68 kg (150 lb)   LMP 01/15/2023 (Exact Date)   Breastfeeding No   BMI 29.29 kg/m     Const: sitting in chair in no acute distress, comfortable  Pulm: no increased work of breathing, no cough  Psych: mood stable,  "appropriate affect  Neuro: A+Ox3       ASSESSMENT/PLAN:                                                    Louisa Peck is a 37 year old female  here to discuss next steps in fertility evaluation. She is s/p 6 attempts at IUI, both with [facility] and at home without success. She cannot afford IVF and wonders if there are any other paths to pregnancy. I do not have access to her records today, but per her report, work up was essentially normal apart from a single occluded fallopian tube. I expressed that this does not typically cause infertility as long as 1 patent tube is present. However, there has not been evaluation for hydrosalpinx, which could cause efflux of fluid into the uterine cavity. Offered pelvic US vs diagnostic laparoscopy, tubal dye study (in hopes that this could be therapeutic), and salpingectomy if tube is found to be abnormal. She would like to start with pelvic US and consider surgery if covered by insurance.     Ongoing fertility evaluation:    - could repeat AMH to evaluate ovulatory reserve  - plan pelvic ultrasound to evaluate for hydrosalpinx  - consider \"diagnostic laparoscopy, tubal dye study, and unilateral salpingectomy\" (removal of tube) of the side that is blocked  - obtain records from [facility]   - can repeat labs at any time    - finally, given partner's age of 35, I recommended that they consider moving forward with her trying to conceive as egg quality is certainly better now than it will be as time progresses.     35 minutes spent on the date of the encounter doing chart review, patient visit and documentation      Renee Decker MD  Obstetrics and Gynecology   [facility]   "

## 2023-03-07 ENCOUNTER — ANCILLARY PROCEDURE (OUTPATIENT)
Dept: ULTRASOUND IMAGING | Facility: CLINIC | Age: 38
End: 2023-03-07
Attending: OBSTETRICS & GYNECOLOGY
Payer: COMMERCIAL

## 2023-03-07 DIAGNOSIS — Z31.41 FERTILITY TESTING: ICD-10-CM

## 2023-03-07 PROCEDURE — 76856 US EXAM PELVIC COMPLETE: CPT | Performed by: OBSTETRICS & GYNECOLOGY

## 2023-03-07 PROCEDURE — 76830 TRANSVAGINAL US NON-OB: CPT | Performed by: OBSTETRICS & GYNECOLOGY

## 2023-03-13 ENCOUNTER — DOCUMENTATION ONLY (OUTPATIENT)
Dept: PSYCHOLOGY | Facility: CLINIC | Age: 38
End: 2023-03-13
Payer: COMMERCIAL

## 2023-03-13 NOTE — PROGRESS NOTES
Patient had an appointment scheduled at 10 AM today by video.  This writer logged into the video appointment and waited until 10:15 AM as well as reached out to patient twice by phone.  A voice message was left reminding patient of the number to call and reschedule if she so chooses.

## 2023-04-12 ENCOUNTER — TELEPHONE (OUTPATIENT)
Dept: OBGYN | Facility: CLINIC | Age: 38
End: 2023-04-12
Payer: COMMERCIAL

## 2023-04-12 NOTE — TELEPHONE ENCOUNTER
Received records from [facility]. Copy to Dr Decker, originals sent to scanning

## 2023-05-08 ENCOUNTER — OFFICE VISIT (OUTPATIENT)
Dept: OBGYN | Facility: CLINIC | Age: 38
End: 2023-05-08
Payer: COMMERCIAL

## 2023-05-08 VITALS — DIASTOLIC BLOOD PRESSURE: 76 MMHG | SYSTOLIC BLOOD PRESSURE: 118 MMHG | BODY MASS INDEX: 28.81 KG/M2 | WEIGHT: 147.5 LBS

## 2023-05-08 DIAGNOSIS — Z31.69 INFERTILITY COUNSELING: Primary | ICD-10-CM

## 2023-05-08 PROCEDURE — 99214 OFFICE O/P EST MOD 30 MIN: CPT | Performed by: OBSTETRICS & GYNECOLOGY

## 2023-05-08 NOTE — NURSING NOTE
Chief Complaint   Patient presents with     Fertility     Follow-up. Patient was last seen 23. Is here to discuss plan going forward so she can get pregnant.        Initial /76   Wt 66.9 kg (147 lb 8 oz)   LMP 2023   BMI 28.81 kg/m   Estimated body mass index is 28.81 kg/m  as calculated from the following:    Height as of 23: 1.524 m (5').    Weight as of this encounter: 66.9 kg (147 lb 8 oz).  BP completed using cuff size: regular    Questioned patient about current smoking habits.  Pt. has never smoked.          The following HM Due: NONE    Jason Ingram CMA

## 2023-05-08 NOTE — PATIENT INSTRUCTIONS
Chromopertubation with diagnostic laparoscopy and possible salpingectomy (removal of fallopian tube) if one appears blocked. If both appear blocked, consider removal of both.     Diagnosis: infertility, tubal blockage, possible hydrosalpinx.       Chromopertubation is a method for the study of fallopian tube patency (a state of being open or unobstructed) for suspected infertility in women caused by fallopian tube obstruction. Occlusion or pathology of the fallopian tubes is the most common cause of suspected infertility.

## 2023-05-08 NOTE — PROGRESS NOTES
SUBJECTIVE:   CC: fertility follow up                                               Louisa Peck is a 37 year old  female who presents to clinic today for follow up of infertility. She has undergone 3 IUI cycels with frozen sperm and 3 attempts at home IUI with fresh sperm. She was found to have left tubal occlusion on HSG through [facility]. She repeated an ultrasound here to evaluate her fibroid uterus and fallopian tubes and was found to have concern for right hydrosalpinx as well as fibroids not affecting the endometrial lining. She is looking for guidance today on next steps. She reports she cannot afford IVF right now. Her female partner is 34 and would also consider conceiving. She does not menstruate regularly at this time.      [facility] records reviewed by me today    Problem list and histories reviewed & adjusted, as indicated.  Additional history: as documented.       naproxen (NAPROSYN) 500 MG tablet, Take 500 mg by mouth as needed for headaches  Prenatal Vit-Fe Fumarate-FA (PRENATAL MULTIVITAMIN W/IRON) 27-0.8 MG tablet, Take 1 tablet by mouth daily    No current facility-administered medications on file prior to visit.    No Known Allergies    OBJECTIVE:   /76   Wt 66.9 kg (147 lb 8 oz)   LMP 2023   BMI 28.81 kg/m     Const: sitting in chair in no acute distress, comfortable  Pulm: no increased work of breathing, no cough  Psych: mood stable, appropriate affect  Neuro: A+Ox3     ASSESSMENT/PLAN:                                                    Louisa Peck is a 37 year old female  here for follow up of infertility. 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 We had a long conversation about declining ovarian reserve and egg quality with age, as well as mechanics of fertility and possibility of hydrosalpinx negatively impacting a developing pregnancy. I encouraged her to have a aranza discussion with her  partner about who will attempt child bearing and the best place to put their resources in attempting to have a family. We discussed IVF and donor egg as well as the option for diagnostic laparoscopy with chromopertubation and possible unilateral or bilateral salpingectomy if one or both fallopain tubes are found to be occluded. She would need to commit to IVF if she continued to desire fertility and both tubes were removed. The only way I would recommend further IUI is if she undergoes the above surgery and is found to have tubal occlusion and therefore undergoes unilateral salpingectomy. This is the only way I can see benefit to further attempts at IUI. I encouraged her to have her partner initiate a work up for AUB.     35 minutes spent by me on the date of the encounter doing chart review, review of outside records, interpretation of tests, patient visit and documentation        Renee Decker MD  Obstetrics and Gynecology   [facility]

## 2023-12-16 ENCOUNTER — HEALTH MAINTENANCE LETTER (OUTPATIENT)
Age: 38
End: 2023-12-16

## 2024-02-24 ENCOUNTER — HEALTH MAINTENANCE LETTER (OUTPATIENT)
Age: 39
End: 2024-02-24

## 2024-06-17 ENCOUNTER — HOSPITAL ENCOUNTER (OUTPATIENT)
Dept: MAMMOGRAPHY | Facility: CLINIC | Age: 39
Discharge: HOME | End: 2024-06-17
Admitting: NURSE PRACTITIONER
Payer: COMMERCIAL

## 2024-06-17 DIAGNOSIS — Z12.31 VISIT FOR SCREENING MAMMOGRAM: ICD-10-CM

## 2024-06-17 PROCEDURE — 77063 BREAST TOMOSYNTHESIS BI: CPT

## 2024-06-19 ENCOUNTER — OFFICE VISIT (OUTPATIENT)
Dept: OBGYN | Facility: CLINIC | Age: 39
End: 2024-06-19
Payer: COMMERCIAL

## 2024-06-19 VITALS — SYSTOLIC BLOOD PRESSURE: 114 MMHG | WEIGHT: 139.1 LBS | BODY MASS INDEX: 27.17 KG/M2 | DIASTOLIC BLOOD PRESSURE: 82 MMHG

## 2024-06-19 DIAGNOSIS — Z12.4 SCREENING FOR MALIGNANT NEOPLASM OF CERVIX: Primary | ICD-10-CM

## 2024-06-19 DIAGNOSIS — G43.109 MIGRAINE WITH AURA AND WITHOUT STATUS MIGRAINOSUS, NOT INTRACTABLE: ICD-10-CM

## 2024-06-19 DIAGNOSIS — Z01.419 ENCOUNTER FOR GYNECOLOGICAL EXAMINATION WITHOUT ABNORMAL FINDING: ICD-10-CM

## 2024-06-19 DIAGNOSIS — Z62.810 HISTORY OF SEXUAL ABUSE IN CHILDHOOD: ICD-10-CM

## 2024-06-19 DIAGNOSIS — Z11.3 SCREEN FOR STD (SEXUALLY TRANSMITTED DISEASE): ICD-10-CM

## 2024-06-19 LAB
CLUE CELLS: PRESENT
TRICHOMONAS, WET PREP: ABNORMAL
WBC'S/HIGH POWER FIELD, WET PREP: ABNORMAL
YEAST, WET PREP: ABNORMAL

## 2024-06-19 PROCEDURE — 86803 HEPATITIS C AB TEST: CPT | Performed by: STUDENT IN AN ORGANIZED HEALTH CARE EDUCATION/TRAINING PROGRAM

## 2024-06-19 PROCEDURE — G0145 SCR C/V CYTO,THINLAYER,RESCR: HCPCS | Performed by: STUDENT IN AN ORGANIZED HEALTH CARE EDUCATION/TRAINING PROGRAM

## 2024-06-19 PROCEDURE — 87210 SMEAR WET MOUNT SALINE/INK: CPT | Performed by: STUDENT IN AN ORGANIZED HEALTH CARE EDUCATION/TRAINING PROGRAM

## 2024-06-19 PROCEDURE — 87491 CHLMYD TRACH DNA AMP PROBE: CPT | Performed by: STUDENT IN AN ORGANIZED HEALTH CARE EDUCATION/TRAINING PROGRAM

## 2024-06-19 PROCEDURE — 99213 OFFICE O/P EST LOW 20 MIN: CPT | Mod: 25 | Performed by: STUDENT IN AN ORGANIZED HEALTH CARE EDUCATION/TRAINING PROGRAM

## 2024-06-19 PROCEDURE — 87624 HPV HI-RISK TYP POOLED RSLT: CPT | Performed by: STUDENT IN AN ORGANIZED HEALTH CARE EDUCATION/TRAINING PROGRAM

## 2024-06-19 PROCEDURE — 86780 TREPONEMA PALLIDUM: CPT | Performed by: STUDENT IN AN ORGANIZED HEALTH CARE EDUCATION/TRAINING PROGRAM

## 2024-06-19 PROCEDURE — 36415 COLL VENOUS BLD VENIPUNCTURE: CPT | Performed by: STUDENT IN AN ORGANIZED HEALTH CARE EDUCATION/TRAINING PROGRAM

## 2024-06-19 PROCEDURE — 99396 PREV VISIT EST AGE 40-64: CPT | Performed by: STUDENT IN AN ORGANIZED HEALTH CARE EDUCATION/TRAINING PROGRAM

## 2024-06-19 PROCEDURE — 87340 HEPATITIS B SURFACE AG IA: CPT | Performed by: STUDENT IN AN ORGANIZED HEALTH CARE EDUCATION/TRAINING PROGRAM

## 2024-06-19 PROCEDURE — 87591 N.GONORRHOEAE DNA AMP PROB: CPT | Performed by: STUDENT IN AN ORGANIZED HEALTH CARE EDUCATION/TRAINING PROGRAM

## 2024-06-19 PROCEDURE — 86706 HEP B SURFACE ANTIBODY: CPT | Performed by: STUDENT IN AN ORGANIZED HEALTH CARE EDUCATION/TRAINING PROGRAM

## 2024-06-19 PROCEDURE — 87389 HIV-1 AG W/HIV-1&-2 AB AG IA: CPT | Performed by: STUDENT IN AN ORGANIZED HEALTH CARE EDUCATION/TRAINING PROGRAM

## 2024-06-19 NOTE — NURSING NOTE
Chief Complaint   Patient presents with    Gyn Exam     Pelvic & Breast exam  Pap due  10/23/2018  NIL  HPV= Neg   Mammo done         Initial /82 (BP Location: Left arm, Cuff Size: Adult Regular)   Wt 63.1 kg (139 lb 1.6 oz)   LMP 06/15/2024 (Exact Date)   BMI 27.17 kg/m   Estimated body mass index is 27.17 kg/m  as calculated from the following:    Height as of 23: 1.524 m (5').    Weight as of this encounter: 63.1 kg (139 lb 1.6 oz).  BP completed using cuff size: regular    Questioned patient about current smoking habits.  Pt. has never smoked.          The following HM Due: pap smear      Bonny Zapata CMA on 2024 at 9:39 AM
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
## 2024-06-20 ENCOUNTER — TELEPHONE (OUTPATIENT)
Dept: PSYCHOLOGY | Facility: CLINIC | Age: 39
End: 2024-06-20
Payer: COMMERCIAL

## 2024-06-20 LAB
C TRACH DNA SPEC QL NAA+PROBE: NEGATIVE
HBV SURFACE AB SERPL IA-ACNC: <3.5 M[IU]/ML
HBV SURFACE AB SERPL IA-ACNC: NONREACTIVE M[IU]/ML
HBV SURFACE AG SERPL QL IA: NONREACTIVE
HCV AB SERPL QL IA: NONREACTIVE
HIV 1+2 AB+HIV1 P24 AG SERPL QL IA: NONREACTIVE
N GONORRHOEA DNA SPEC QL NAA+PROBE: NEGATIVE
T PALLIDUM AB SER QL: NONREACTIVE

## 2024-06-20 NOTE — TELEPHONE ENCOUNTER
[facility] Call Center    Phone Message    May a detailed message be left on voicemail: yes     Reason for Call: Other: Referral     *1st Attempt*  Pt's provider sent a referral to our clinic for pt to start sex therapy with one of our providers.     Action Taken: Other: Left vm for pt state we're not in taking pts at the moment and to give us a call in mid/late July to schedule an intake appointment for August    Travel Screening: Not Applicable     Date of Service: 6/20/2024

## 2024-06-21 LAB
HPV HR 12 DNA CVX QL NAA+PROBE: NEGATIVE
HPV16 DNA CVX QL NAA+PROBE: NEGATIVE
HPV18 DNA CVX QL NAA+PROBE: NEGATIVE
HUMAN PAPILLOMA VIRUS FINAL DIAGNOSIS: NORMAL

## 2024-06-24 LAB
BKR LAB AP GYN ADEQUACY: NORMAL
BKR LAB AP GYN INTERPRETATION: NORMAL
BKR LAB AP PREVIOUS ABNORMAL: NORMAL
PATH REPORT.COMMENTS IMP SPEC: NORMAL
PATH REPORT.COMMENTS IMP SPEC: NORMAL
PATH REPORT.RELEVANT HX SPEC: NORMAL

## 2024-06-28 ENCOUNTER — TELEPHONE (OUTPATIENT)
Dept: PSYCHOLOGY | Facility: CLINIC | Age: 39
End: 2024-06-28
Payer: COMMERCIAL

## 2024-06-28 NOTE — TELEPHONE ENCOUNTER
[facility] Call Center     Phone Message     May a detailed message be left on voicemail: yes      Reason for Call: Other: Referral      *2nd Attempt*  Pt's provider sent a referral to our clinic for pt to start sex therapy with one of our providers.      Action Taken: Other: Left vm for pt notifying her that we are not accepting new pts at the moment for therapy and to give us a call in the next 2 to 3 months for any future openings.   [employer] message was sent to pt on 6/28.     Travel Screening: Not Applicable          Date of Service: 6/28/2024

## 2024-07-19 ENCOUNTER — OFFICE VISIT (OUTPATIENT)
Dept: FAMILY MEDICINE | Facility: CLINIC | Age: 39
End: 2024-07-19
Payer: COMMERCIAL

## 2024-07-19 VITALS
DIASTOLIC BLOOD PRESSURE: 62 MMHG | SYSTOLIC BLOOD PRESSURE: 114 MMHG | HEART RATE: 79 BPM | WEIGHT: 142.8 LBS | TEMPERATURE: 98.1 F | HEIGHT: 60 IN | BODY MASS INDEX: 28.03 KG/M2 | OXYGEN SATURATION: 98 % | RESPIRATION RATE: 14 BRPM

## 2024-07-19 DIAGNOSIS — Z00.00 ROUTINE HISTORY AND PHYSICAL EXAMINATION OF ADULT: Primary | ICD-10-CM

## 2024-07-19 DIAGNOSIS — L91.8 SKIN TAG: ICD-10-CM

## 2024-07-19 PROCEDURE — 90471 IMMUNIZATION ADMIN: CPT | Performed by: GENERAL PRACTICE

## 2024-07-19 PROCEDURE — 90480 ADMN SARSCOV2 VAC 1/ONLY CMP: CPT | Performed by: GENERAL PRACTICE

## 2024-07-19 PROCEDURE — 90746 HEPB VACCINE 3 DOSE ADULT IM: CPT | Performed by: GENERAL PRACTICE

## 2024-07-19 PROCEDURE — 99396 PREV VISIT EST AGE 40-64: CPT | Mod: 25 | Performed by: GENERAL PRACTICE

## 2024-07-19 PROCEDURE — 99213 OFFICE O/P EST LOW 20 MIN: CPT | Mod: 25 | Performed by: GENERAL PRACTICE

## 2024-07-19 PROCEDURE — 91320 SARSCV2 VAC 30MCG TRS-SUC IM: CPT | Performed by: GENERAL PRACTICE

## 2024-07-19 SDOH — HEALTH STABILITY: PHYSICAL HEALTH: ON AVERAGE, HOW MANY MINUTES DO YOU ENGAGE IN EXERCISE AT THIS LEVEL?: 0 MIN

## 2024-07-19 SDOH — HEALTH STABILITY: PHYSICAL HEALTH: ON AVERAGE, HOW MANY DAYS PER WEEK DO YOU ENGAGE IN MODERATE TO STRENUOUS EXERCISE (LIKE A BRISK WALK)?: 0 DAYS

## 2024-07-19 ASSESSMENT — ANXIETY QUESTIONNAIRES
GAD7 TOTAL SCORE: 7
7. FEELING AFRAID AS IF SOMETHING AWFUL MIGHT HAPPEN: SEVERAL DAYS
1. FEELING NERVOUS, ANXIOUS, OR ON EDGE: SEVERAL DAYS
3. WORRYING TOO MUCH ABOUT DIFFERENT THINGS: SEVERAL DAYS
7. FEELING AFRAID AS IF SOMETHING AWFUL MIGHT HAPPEN: SEVERAL DAYS
IF YOU CHECKED OFF ANY PROBLEMS ON THIS QUESTIONNAIRE, HOW DIFFICULT HAVE THESE PROBLEMS MADE IT FOR YOU TO DO YOUR WORK, TAKE CARE OF THINGS AT HOME, OR GET ALONG WITH OTHER PEOPLE: NOT DIFFICULT AT ALL
6. BECOMING EASILY ANNOYED OR IRRITABLE: SEVERAL DAYS
2. NOT BEING ABLE TO STOP OR CONTROL WORRYING: SEVERAL DAYS
8. IF YOU CHECKED OFF ANY PROBLEMS, HOW DIFFICULT HAVE THESE MADE IT FOR YOU TO DO YOUR WORK, TAKE CARE OF THINGS AT HOME, OR GET ALONG WITH OTHER PEOPLE?: NOT DIFFICULT AT ALL
4. TROUBLE RELAXING: SEVERAL DAYS
GAD7 TOTAL SCORE: 7
GAD7 TOTAL SCORE: 7
5. BEING SO RESTLESS THAT IT IS HARD TO SIT STILL: SEVERAL DAYS

## 2024-07-19 ASSESSMENT — PAIN SCALES - GENERAL: PAINLEVEL: NO PAIN (0)

## 2024-07-19 ASSESSMENT — PATIENT HEALTH QUESTIONNAIRE - PHQ9
SUM OF ALL RESPONSES TO PHQ QUESTIONS 1-9: 7
10. IF YOU CHECKED OFF ANY PROBLEMS, HOW DIFFICULT HAVE THESE PROBLEMS MADE IT FOR YOU TO DO YOUR WORK, TAKE CARE OF THINGS AT HOME, OR GET ALONG WITH OTHER PEOPLE: NOT DIFFICULT AT ALL
SUM OF ALL RESPONSES TO PHQ QUESTIONS 1-9: 7

## 2024-07-19 ASSESSMENT — SOCIAL DETERMINANTS OF HEALTH (SDOH): HOW OFTEN DO YOU GET TOGETHER WITH FRIENDS OR RELATIVES?: ONCE A WEEK

## 2024-07-19 NOTE — PROGRESS NOTES
Preventive Care Visit  [facility] RAFIQMORAMA Argueta MD, Internal Medicine  Jul 19, 2024      Assessment & Plan     Routine history and physical examination of adult  Did pap with GYN  - Comprehensive metabolic panel (BMP + Alb, Alk Phos, ALT, AST, Total. Bili, TP); Future  - Lipid panel reflex to direct LDL Fasting; Future  - Hemoglobin A1c; Future    Skin tag  Around the neck  - Adult Dermatology  Referral; Future            BMI  Estimated body mass index is 27.89 kg/m  as calculated from the following:    Height as of this encounter: 1.524 m (5').    Weight as of this encounter: 64.8 kg (142 lb 12.8 oz).   Weight management plan: Discussed healthy diet and exercise guidelines    Counseling  Appropriate preventive services were addressed with this patient via screening, questionnaire, or discussion as appropriate for fall prevention, nutrition, physical activity, Tobacco-use cessation, weight loss and cognition.  Checklist reviewing preventive services available has been given to the patient.  Reviewed patient's diet, addressing concerns and/or questions.   The patient was instructed to see the dentist every 6 months.   The patient's PHQ-9 score is consistent with mild depression. She was provided with information regarding depression.           Ernestine Jasso is a 38 year old, presenting for the following:  Physical        7/19/2024     3:52 PM   Additional Questions   Roomed by Cyndi Josue        Health Care Directive  Patient does not have a Health Care Directive or Living Will: Discussed advance care planning with patient; however, patient declined at this time.    HPI    Pt saw OB recently and had PAP and breast exam completed then              7/19/2024   General Health   How would you rate your overall physical health? Good   Feel stress (tense, anxious, or unable to sleep) Very much      (!) STRESS CONCERN      7/19/2024   Nutrition   Three or more servings of calcium  each day? Yes   Diet: Regular (no restrictions)   How many servings of fruit and vegetables per day? (!) 2-3   How many sweetened beverages each day? 0-1            7/19/2024   Exercise   Days per week of moderate/strenous exercise 0 days   Average minutes spent exercising at this level 0 min      (!) EXERCISE CONCERN      7/19/2024   Social Factors   Frequency of gathering with friends or relatives Once a week   Worry food won't last until get money to buy more No   Food not last or not have enough money for food? No   Do you have housing? (Housing is defined as stable permanent housing and does not include staying ouside in a car, in a tent, in an abandoned building, in an overnight shelter, or couch-surfing.) Yes   Are you worried about losing your housing? No   Lack of transportation? No   Unable to get utilities (heat,electricity)? No            7/19/2024   Dental   Dentist two times every year? (!) NO            7/19/2024   TB Screening   Were you born outside of the [place]? Yes          Today's PHQ-9 Score:       7/19/2024     3:57 PM   PHQ-9 SCORE   PHQ-9 Total Score MyChart 7 (Mild depression)   PHQ-9 Total Score 7         7/19/2024   Substance Use   Alcohol more than 3/day or more than 7/wk No   Do you use any other substances recreationally? No        Social History     Tobacco Use    Smoking status: Never    Smokeless tobacco: Never   Substance Use Topics    Alcohol use: Yes     Comment: Rare    Drug use: No           6/17/2024   LAST FHS-7 RESULTS   1st degree relative breast or ovarian cancer No   Any relative bilateral breast cancer No   Any male have breast cancer No   Any ONE woman have BOTH breast AND ovarian cancer No   Any woman with breast cancer before 50yrs No   2 or more relatives with breast AND/OR ovarian cancer No   2 or more relatives with breast AND/OR bowel cancer No                   7/19/2024   STI Screening   New sexual partner(s) since last STI/HIV test? No        History of abnormal  Pap smear:         Latest Ref Rng & Units 6/19/2024     9:47 AM 10/23/2018     4:00 PM 10/23/2018     3:58 PM   PAP / HPV   PAP  Negative for Intraepithelial Lesion or Malignancy (NILM)      PAP (Historical)    NIL    HPV 16 DNA Negative Negative  Negative     HPV 18 DNA Negative Negative  Negative     Other HR HPV Negative Negative  Negative       ASCVD Risk   The 10-year ASCVD risk score (Thomas HOLDER, et al., 2019) is: 0.3%    Values used to calculate the score:      Age: 38 years      Sex: Female      Is Non- : No      Diabetic: No      Tobacco smoker: No      Systolic Blood Pressure: 114 mmHg      Is BP treated: No      HDL Cholesterol: 84 mg/dL      Total Cholesterol: 229 mg/dL        7/19/2024   Contraception/Family Planning   Questions about contraception or family planning (!) YES            Reviewed and updated as needed this visit by Provider                          Review of Systems  Constitutional, HEENT, cardiovascular, pulmonary, GI, , musculoskeletal, neuro, skin, endocrine and psych systems are negative, except as otherwise noted.     Objective    Exam  /62 (BP Location: Right arm, Patient Position: Sitting, Cuff Size: Adult Regular)   Pulse 79   Temp 98.1  F (36.7  C) (Oral)   Resp 14   Ht 1.524 m (5')   Wt 64.8 kg (142 lb 12.8 oz)   LMP 07/11/2024 (Approximate)   SpO2 98%   BMI 27.89 kg/m     Estimated body mass index is 27.89 kg/m  as calculated from the following:    Height as of this encounter: 1.524 m (5').    Weight as of this encounter: 64.8 kg (142 lb 12.8 oz).    Physical Exam  Constitutional:       Appearance: Normal appearance.   HENT:      Head: Normocephalic and atraumatic.      Nose: Nose normal.      Mouth/Throat:      Mouth: Mucous membranes are moist.      Pharynx: Oropharynx is clear.   Eyes:      Extraocular Movements: Extraocular movements intact.      Conjunctiva/sclera: Conjunctivae normal.   Cardiovascular:      Rate and Rhythm:  Normal rate and regular rhythm.      Heart sounds: Normal heart sounds.   Pulmonary:      Effort: Pulmonary effort is normal.      Breath sounds: Normal breath sounds.   Abdominal:      General: Abdomen is flat.      Palpations: Abdomen is soft.   Musculoskeletal:         General: Normal range of motion.      Cervical back: Normal range of motion and neck supple.   Skin:     General: Skin is warm.      Comments: Multiple small skin tags around the neck   Neurological:      General: No focal deficit present.      Mental Status: She is alert and oriented to person, place, and time. Mental status is at baseline.   Psychiatric:         Mood and Affect: Mood and affect normal.         Speech: Speech normal.         Behavior: Behavior normal. Behavior is cooperative.         Thought Content: Thought content normal.         Cognition and Memory: Cognition normal.         Judgment: Judgment normal.               Signed Electronically by: Bertha Argueta MD    .undefined[^^

## 2024-07-19 NOTE — PATIENT INSTRUCTIONS
"Return to clinic for 2nd hep B around 2 months and 3rd hep B around 6 months.    Patient Education   Consejos de atención preventiva  Se trata de consejos generales que solemos ofrecer para ayudar a las personas a mantenerse saludables. Alcaraz equipo de atención puede darle consejos específicos. Hable con ellos sobre keith propias necesidades de atención preventiva.  Estilo de gonzalez  Ejercítese, valdez mínimo, 150 minutos a la semana (30 minutos al día, 5 días a la semana).  Realice actividades de fortalecimiento muscular 2 días a la semana, ya que contribuyen al control del peso y a la prevención de enfermedades.  No fume.  Use protector solar para prevenir el cáncer de piel.  Cada 2 a 5 años, realice pruebas de detección de radón en alcaraz hogar. Se trata de un gas incoloro e inodoro que puede dañar los pulmones. Para obtener más información, visite [email or web address] y busque \"Radon in Homes\" (Radón en los hogares).  Mantenga las trace descargadas y bajo llave en un lugar seguro, valdze davina caja rambo o davina cámara blindada, o use un candado para trace y esconda las llaves. Guarde siempre las balas por separado. Para obtener más información, visite [employer].net.[place].gov y busque \"Safe Gun Storage\" (Almacenamiento seguro michelle).  Alimentación  Consuma 5 o más porciones de frutas y verduras al día.  Pruebe el pan de bean, el arroz integral y la pasta integral (en lugar de pan fernandez, arroz fernandez y pasta).  Consuma suficiente calcio y vitamina D. Revise la etiqueta de los alimentos y procure alcanzar el 100 % de la ingesta diaria recomendada (IDR).  Exámenes periódicos  Hágase un examen dental y davina limpieza cada 6 meses.  Visite a alcaraz equipo de atención médica todos los años para hablar sobre lo siguiente:  Todo cambio en alcaraz gurpreet.  Todo medicamento que alcaraz equipo de atención le haya recetado.  Atención preventiva, planificación familiar y formas de prevenir enfermedades crónicas.  Inyecciones (vacunas)   Vacunas contra el virus del " papiloma humano (VPH) (hasta los 26 años), si nunca se las fried colocado.  Vacunas contra la hepatitis B (hasta los 59 años), si nunca se las fried colocado.  Vacuna contra la COVID-19: vacúnese cuando corresponda.  Vacuna contra la gripe: vacúnese contra la gripe todos los años.  Vacuna contra el tétanos: vacúnese contra el tétanos cada 10 años.  Vacunas contra el neumococo, la hepatitis A y el virus sincicial respiratorio (VSR): pregunte a alcaraz equipo de atención si las necesita en función de alcaraz riesgo.  Vacuna contra el herpes zóster (para personas de 50 años o más).  Pruebas médicas generales  Examen de detección de diabetes:  A partir de los 35 años, hágase exámenes de detección de diabetes, valdez mínimo, cada 3 años.  Si tiene menos de 35 años, pregunte a alcaraz equipo de atención si debe hacerlo.  Prueba de colesterol: a los 39 años, comience a hacerse davina prueba de colesterol cada 5 años o con mayor frecuencia si se lo aconsejan.  Exploración de densidad ósea (DEXA): a los 50 años, pregunte a alcaraz equipo de atención si debe hacerse esta exploración para detectar osteoporosis (fragilidad en los huesos).  Hepatitis C: hágase la prueba, valdez mínimo, davina vez en la gonzalez.  Examen de detección de aneurismas aórticos abdominales: hable con alcaraz médico sobre la posibilidad de hacerse geneva examen de detección si cumple alguna de las siguientes condiciones:  fumó alguna vez;  y  es hombre según alcaraz sexo biológico;  y  tiene entre 65 y 75 años.  Infecciones de transmisión sexual (ITS)  Antes de los 24 años, pregunte a alcaraz equipo de atención si debe hacerse exámenes de detección de ITS.  Después de los 24 años, hágase exámenes de detección de ITS si está en riesgo. Está en riesgo de contraer alguna ITS (incluido el virus de la inmunodeficiencia adquirida [VIH]) en los siguientes casos:  Tiene relaciones sexuales con más de davina persona.  No usa preservativos siempre que tiene relaciones sexuales.  A usted o a alcaraz mary le diagnosticaron  davina infección de transmisión sexual.  Si está en riesgo de contraer el VIH, consulte sobre los medicamentos de la profilaxis de preexposición (Pre-Exposure Prophylaxis, PrEP) para prevenirlo.  Hágase la prueba del VIH, valdez mínimo, davina vez en la gonzalez, tanto si está en riesgo de contraer el virus valdez si no.  Pruebas de detección de cáncer  Examen de detección de cáncer de varinder uterino: si tiene varinder uterino, comience a hacerse pruebas periódicas de detección de cáncer de varinder uterino a los 21 años. La mayoría de las personas que se hacen exámenes periódicos de detección y obtienen resultados normales pueden dejar de hacerlo a partir de los 65 años. Hable sobre esto con alcaraz proveedor.  Exploración de detección de cáncer de mama (mamografía): si alguna vez ha tenido mamas, comience a hacerse mamografías periódicas a partir de los 40 años. Se trata de davina exploración para detectar el cáncer de mama.  Examen de detección de cáncer de colon: es importante comenzar a hacerse los exámenes de detección de cáncer de colon a los 45 años.  Hágase davina colonoscopía cada 10 años (o con más frecuencia si está en riesgo) O pregunte a alcaraz proveedor sobre pruebas de heces, valdez la prueba inmunoquímica fecal (Fecal Immunochemical Test, FIT) cada año o la prueba Cologuard cada 3 años.  Para obtener más información sobre las opciones de las pruebas que tiene a disposición, visite: www.fvfiles.[email or web address].  Si necesita ayuda para juliana davina decisión, visite: [email or web address].  Prueba de detección de cáncer de próstata: si tiene próstata y está entre los 55 y 69 años, pregunte a alcaraz proveedor si le convendría hacerse davina prueba anual de detección de cáncer de próstata.  Examen de detección de cáncer de pulmón: si fuma o fumó y tiene entre 50 y 80 años, pregunte a alcaraz equipo de atención si los exámenes continuos de detección de cáncer de pulmón son adecuados para usted.    Solo para uso con fines informativos. Dahlia documento no pretende  sustituir ninguna recomendación médica. Derechos de autor   2023 [facility].   Todos los derechos reservados. Revisión clínica a cargo de  [facility]. CartRescuer [id number] - REV 04/24.  Learning About Stress  What is stress?     Stress is your body's response to a hard situation. Your body can have a physical, emotional, or mental response. Stress is a fact of life for most people, and it affects everyone differently. What causes stress for you may not be stressful for someone else.  A lot of things can cause stress. You may feel stress when you go on a job interview, take a test, or run a race. This kind of short-term stress is normal and even useful. It can help you if you need to work hard or react quickly. For example, stress can help you finish an important job on time.  Long-term stress is caused by ongoing stressful situations or events. Examples of long-term stress include long-term health problems, ongoing problems at work, or conflicts in your family. Long-term stress can harm your health.  How does stress affect your health?  When you are stressed, your body responds as though you are in danger. It makes hormones that speed up your heart, make you breathe faster, and give you a burst of energy. This is called the fight-or-flight stress response. If the stress is over quickly, your body goes back to normal and no harm is done.  But if stress happens too often or lasts too long, it can have bad effects. Long-term stress can make you more likely to get sick, and it can make symptoms of some diseases worse. If you tense up when you are stressed, you may develop neck, shoulder, or low back pain. Stress is linked to high blood pressure and heart disease.  Stress also harms your emotional health. It can make you malik, tense, or depressed. Your relationships may suffer, and you may not do well at work or school.  What can you do to manage stress?  You can try these things  to help manage stress:   Do something active. Exercise or activity can help reduce stress. Walking is a great way to get started. Even everyday activities such as housecleaning or yard work can help.  Try yoga or dana chi. These techniques combine exercise and meditation. You may need some training at first to learn them.  Do something you enjoy. For example, listen to music or go to a movie. Practice your hobby or do volunteer work.  Meditate. This can help you relax, because you are not worrying about what happened before or what may happen in the future.  Do guided imagery. Imagine yourself in any setting that helps you feel calm. You can use online videos, books, or a teacher to guide you.  Do breathing exercises. For example:  From a standing position, bend forward from the waist with your knees slightly bent. Let your arms dangle close to the floor.  Breathe in slowly and deeply as you return to a standing position. Roll up slowly and lift your head last.  Hold your breath for just a few seconds in the standing position.  Breathe out slowly and bend forward from the waist.  Let your feelings out. Talk, laugh, cry, and express anger when you need to. Talking with supportive friends or family, a counselor, or a rj leader about your feelings is a healthy way to relieve stress. Avoid discussing your feelings with people who make you feel worse.  Write. It may help to write about things that are bothering you. This helps you find out how much stress you feel and what is causing it. When you know this, you can find better ways to cope.  What can you do to prevent stress?  You might try some of these things to help prevent stress:  Manage your time. This helps you find time to do the things you want and need to do.  Get enough sleep. Your body recovers from the stresses of the day while you are sleeping.  Get support. Your family, friends, and community can make a difference in how you experience stress.  Limit  "your news feed. Avoid or limit time on social media or news that may make you feel stressed.  Do something active. Exercise or activity can help reduce stress. Walking is a great way to get started.  Where can you learn more?  Go to https://www.[employer].net/patiented  Enter N032 in the search box to learn more about \"Learning About Stress.\"  Current as of: October 24, 2023               Content Version: 14.0    8009-4143 [employer].   Care instructions adapted under license by your healthcare professional. If you have questions about a medical condition or this instruction, always ask your healthcare professional. [employer] disclaims any warranty or liability for your use of this information.      Learning About Depression Screening  What is depression screening?  Depression screening is a way to see if you have depression symptoms. It may be done by a doctor or counselor. It's often part of a routine checkup. That's because your mental health is just as important as your physical health.  Depression is a mental health condition that affects how you feel, think, and act. You may:  Have less energy.  Lose interest in your daily activities.  Feel sad and grouchy for a long time.  Depression is very common. It affects people of all ages.  Many things can lead to depression. Some people become depressed after they have a stroke or find out they have a major illness like cancer or heart disease. The death of a loved one or a breakup may lead to depression. It can run in families. Most experts believe that a combination of inherited genes and stressful life events can cause it.  What happens during screening?  You may be asked to fill out a form about your depression symptoms. You and the doctor will discuss your answers. The doctor may ask you more questions to learn more about how you think, act, and feel.  What happens after screening?  If you have symptoms of depression, your doctor will " "talk to you about your options.  Doctors usually treat depression with medicines or counseling. Often, combining the two works best. Many people don't get help because they think that they'll get over the depression on their own. But people with depression may not get better unless they get treatment.  The cause of depression is not well understood. There may be many factors involved. But if you have depression, it's not your fault.  A serious symptom of depression is thinking about death or suicide. If you or someone you care about talks about this or about feeling hopeless, get help right away.  It's important to know that depression can be treated. Medicine, counseling, and self-care may help.  Where can you learn more?  Go to https://www.[employer].net/patiented  Enter T185 in the search box to learn more about \"Learning About Depression Screening.\"  Current as of: June 24, 2023               Content Version: 14.0    3878-6382 [employer].   Care instructions adapted under license by your healthcare professional. If you have questions about a medical condition or this instruction, always ask your healthcare professional. [employer] disclaims any warranty or liability for your use of this information.         "

## 2024-07-19 NOTE — PROGRESS NOTES
Prior to immunization administration, verified patients identity using patient s name and date of birth. Please see Immunization Activity for additional information.     Screening Questionnaire for Adult Immunization    Are you sick today?   No   Do you have allergies to medications, food, a vaccine component or latex?   No   Have you ever had a serious reaction after receiving a vaccination?   No   Do you have a long-term health problem with heart, lung, kidney, or metabolic disease (e.g., diabetes), asthma, a blood disorder, no spleen, complement component deficiency, a cochlear implant, or a spinal fluid leak?  Are you on long-term aspirin therapy?   No   Do you have cancer, leukemia, HIV/AIDS, or any other immune system problem?   No   Do you have a parent, brother, or sister with an immune system problem?   No   In the past 3 months, have you taken medications that affect  your immune system, such as prednisone, other steroids, or anticancer drugs; drugs for the treatment of rheumatoid arthritis, Crohn s disease, or psoriasis; or have you had radiation treatments?   No   Have you had a seizure, or a brain or other nervous system problem?   No   During the past year, have you received a transfusion of blood or blood    products, or been given immune (gamma) globulin or antiviral drug?   No   For women: Are you pregnant or is there a chance you could become       pregnant during the next month?   No   Have you received any vaccinations in the past 4 weeks?   No     Immunization questionnaire answers were all negative.      Patient instructed to remain in clinic for 15 minutes afterwards, and to report any adverse reactions.     Screening performed by Yasmin Waller MA on 7/19/2024 at 4:45 PM.

## 2024-07-24 ENCOUNTER — LAB (OUTPATIENT)
Dept: LAB | Facility: CLINIC | Age: 39
End: 2024-07-24
Payer: COMMERCIAL

## 2024-07-24 DIAGNOSIS — Z00.00 ROUTINE HISTORY AND PHYSICAL EXAMINATION OF ADULT: ICD-10-CM

## 2024-07-24 LAB
ALBUMIN SERPL BCG-MCNC: 4.2 G/DL (ref 3.5–5.2)
ALP SERPL-CCNC: 80 U/L (ref 40–150)
ALT SERPL W P-5'-P-CCNC: 19 U/L (ref 0–50)
ANION GAP SERPL CALCULATED.3IONS-SCNC: 9 MMOL/L (ref 7–15)
AST SERPL W P-5'-P-CCNC: 24 U/L (ref 0–45)
BILIRUB SERPL-MCNC: 0.3 MG/DL
BUN SERPL-MCNC: 15.3 MG/DL (ref 6–20)
CALCIUM SERPL-MCNC: 9.1 MG/DL (ref 8.8–10.4)
CHLORIDE SERPL-SCNC: 101 MMOL/L (ref 98–107)
CHOLEST SERPL-MCNC: 190 MG/DL
CREAT SERPL-MCNC: 1.05 MG/DL (ref 0.51–0.95)
EGFRCR SERPLBLD CKD-EPI 2021: 69 ML/MIN/1.73M2
FASTING STATUS PATIENT QL REPORTED: YES
FASTING STATUS PATIENT QL REPORTED: YES
GLUCOSE SERPL-MCNC: 87 MG/DL (ref 70–99)
HBA1C MFR BLD: 5.4 % (ref 0–5.6)
HCO3 SERPL-SCNC: 26 MMOL/L (ref 22–29)
HDLC SERPL-MCNC: 77 MG/DL
LDLC SERPL CALC-MCNC: 97 MG/DL
NONHDLC SERPL-MCNC: 113 MG/DL
POTASSIUM SERPL-SCNC: 4.5 MMOL/L (ref 3.4–5.3)
PROT SERPL-MCNC: 7.2 G/DL (ref 6.4–8.3)
SODIUM SERPL-SCNC: 136 MMOL/L (ref 135–145)
TRIGL SERPL-MCNC: 79 MG/DL

## 2024-07-24 PROCEDURE — 80053 COMPREHEN METABOLIC PANEL: CPT

## 2024-07-24 PROCEDURE — 83036 HEMOGLOBIN GLYCOSYLATED A1C: CPT

## 2024-07-24 PROCEDURE — 36415 COLL VENOUS BLD VENIPUNCTURE: CPT

## 2024-07-24 PROCEDURE — 80061 LIPID PANEL: CPT

## 2024-12-18 ENCOUNTER — OFFICE VISIT (OUTPATIENT)
Dept: DERMATOLOGY | Facility: CLINIC | Age: 39
End: 2024-12-18
Attending: GENERAL PRACTICE
Payer: COMMERCIAL

## 2024-12-18 DIAGNOSIS — L81.4 LENTIGINES: Primary | ICD-10-CM

## 2024-12-18 DIAGNOSIS — L83 ACANTHOSIS NIGRICANS: ICD-10-CM

## 2024-12-18 DIAGNOSIS — D22.9 MULTIPLE BENIGN NEVI: ICD-10-CM

## 2024-12-18 DIAGNOSIS — L82.0 INFLAMED SEBORRHEIC KERATOSIS: ICD-10-CM

## 2024-12-18 DIAGNOSIS — L91.8 SKIN TAG: ICD-10-CM

## 2024-12-18 ASSESSMENT — PAIN SCALES - GENERAL: PAINLEVEL_OUTOF10: NO PAIN (0)

## 2024-12-18 NOTE — PROGRESS NOTES
[facility] Dermatology Note    Encounter Date: Dec 18, 2024    Dermatology Problem List:    ______________________________________    Impression/Plan:  Louisa was seen today for skin tags.    Diagnoses and all orders for this visit:    Lentigines  Multiple benign nevi  - Reviewed the compounding benefits of incremental changes to sun protective clothing behaviors including increased frequency of sunscreen and sun protective clothing like broad brimmed hats and longsleeved UPF containing clothing    Acanthosis nigricans  - fam history, lipids, A1c, cmp reviewed  - discussed relation to insulin resistance and weight gain     Inflamed seborrheic keratosis  -     DESTRUCT BENIGN LESION, 15 OR MORE  - x45 around neck      Cryotherapy procedure note: After verbal consent and discussion of risks and benefits including but no limited to dyspigmentation/scar, blister, and pain, 45 ISKs around neck was(were) treated with 1-2mm freeze border for 2 cycles with liquid nitrogen. Post cryotherapy instructions were provided.     Follow-up PRN.       Staff Involved:  Staff Only    Stewart Crabtree MD   of Dermatology  Department of Dermatology  [facility]      CC:   Chief Complaint   Patient presents with    Skin Tags       History of Present Illness:  Ms. Louisa Peck is a 39 year old female who presents as a new patient.    Pt presents today for concerns about spots on trunk and extremities     Getting new spots around neck. Has gained weight, has fam hx of DM. A1c in July and lipids were wnl       Labs:      Physical exam:  Vitals: Breastfeeding No   GEN: well developed, well-nourished, in no acute distress, in a pleasant mood.     SKIN: Burkett phototype 3  - Focused examination of the neck and face was performed.  - Flat brown macules and patches in a sun exposed areas on face and extremities  - scattered brown papules on trunk and extremities    - Stuck on brown papules on trunk and extremities   - No other lesions of concern on areas examined.     Past Medical History:   Past Medical History:   Diagnosis Date    Anemia     Thyroid nodule      Past Surgical History:   Procedure Laterality Date    ARTHROSCOPY KNEE WITH MEDIAL MENISCECTOMY Left     BIOPSY      ORTHOPEDIC SURGERY      REMV/REVISN BOOT/BODY CAST Left        Social History:   reports that she has never smoked. She has never been exposed to tobacco smoke. She has never used smokeless tobacco. She reports current alcohol use. She reports that she does not use drugs.    Family History:  Family History   Problem Relation Age of Onset    Diabetes Father        Medications:  No current outpatient medications on file.     No Known Allergies

## 2024-12-18 NOTE — LETTER
12/18/2024      Louisa Peck  [address]  [place] [id number]      Dear Colleague,    Thank you for referring your patient, Louisa Peck, to the [facility]. Please see a copy of my visit note below.    [facility] Dermatology Note    Encounter Date: Dec 18, 2024    Dermatology Problem List:    ______________________________________    Impression/Plan:  Louisa was seen today for skin tags.    Diagnoses and all orders for this visit:    Lentigines  Multiple benign nevi  - Reviewed the compounding benefits of incremental changes to sun protective clothing behaviors including increased frequency of sunscreen and sun protective clothing like broad brimmed hats and longsleeved UPF containing clothing    Inflamed seborrheic keratosis  -     DESTRUCT BENIGN LESION, 15 OR MORE  - x45 around neck      Cryotherapy procedure note: After verbal consent and discussion of risks and benefits including but no limited to dyspigmentation/scar, blister, and pain, 45 ISKs around neck was(were) treated with 1-2mm freeze border for 2 cycles with liquid nitrogen. Post cryotherapy instructions were provided.     Follow-up PRN.       Staff Involved:  Staff Only    Stewart Crabtree MD   of Dermatology  Department of Dermatology  [facility]      CC:   Chief Complaint   Patient presents with     Skin Tags       History of Present Illness:  Ms. Louisa Peck is a 39 year old female who presents as a new patient.    Pt presents today for concerns about spots on trunk and extremities     Getting new spots around neck. Has gained weight, has fam hx of DM. A1c in July and lipids were wnl       Labs:      Physical exam:  Vitals: Breastfeeding No   GEN: well developed, well-nourished, in no acute distress, in a pleasant mood.     SKIN: Burkett phototype 3  - Focused examination of the neck and face was  performed.  - Flat brown macules and patches in a sun exposed areas on face and extremities  - scattered brown papules on trunk and extremities   - Stuck on brown papules on trunk and extremities   - No other lesions of concern on areas examined.     Past Medical History:   Past Medical History:   Diagnosis Date     Anemia      Thyroid nodule      Past Surgical History:   Procedure Laterality Date     ARTHROSCOPY KNEE WITH MEDIAL MENISCECTOMY Left      BIOPSY       ORTHOPEDIC SURGERY       REMV/REVISN BOOT/BODY CAST Left        Social History:   reports that she has never smoked. She has never been exposed to tobacco smoke. She has never used smokeless tobacco. She reports current alcohol use. She reports that she does not use drugs.    Family History:  Family History   Problem Relation Age of Onset     Diabetes Father        Medications:  No current outpatient medications on file.     No Known Allergies              Again, thank you for allowing me to participate in the care of your patient.        Sincerely,        Stewart Crabtree MD

## 2025-08-07 ENCOUNTER — APPOINTMENT (OUTPATIENT)
Dept: CT IMAGING | Facility: CLINIC | Age: 40
End: 2025-08-07
Attending: EMERGENCY MEDICINE
Payer: COMMERCIAL

## 2025-08-07 ENCOUNTER — HOSPITAL ENCOUNTER (EMERGENCY)
Facility: CLINIC | Age: 40
Discharge: HOME | End: 2025-08-07
Attending: EMERGENCY MEDICINE | Admitting: EMERGENCY MEDICINE
Payer: COMMERCIAL

## 2025-08-07 VITALS
HEART RATE: 68 BPM | OXYGEN SATURATION: 100 % | DIASTOLIC BLOOD PRESSURE: 77 MMHG | TEMPERATURE: 98.5 F | SYSTOLIC BLOOD PRESSURE: 113 MMHG | RESPIRATION RATE: 18 BRPM

## 2025-08-07 DIAGNOSIS — S16.1XXA CERVICAL STRAIN, INITIAL ENCOUNTER: Primary | ICD-10-CM

## 2025-08-07 PROCEDURE — 250N000013 HC RX MED GY IP 250 OP 250 PS 637: Performed by: EMERGENCY MEDICINE

## 2025-08-07 PROCEDURE — 72125 CT NECK SPINE W/O DYE: CPT

## 2025-08-07 PROCEDURE — 99284 EMERGENCY DEPT VISIT MOD MDM: CPT | Mod: 25 | Performed by: EMERGENCY MEDICINE

## 2025-08-07 RX ORDER — CYCLOBENZAPRINE HCL 10 MG
10 TABLET ORAL 3 TIMES DAILY PRN
Qty: 10 TABLET | Refills: 0 | Status: SHIPPED | OUTPATIENT
Start: 2025-08-07

## 2025-08-07 RX ORDER — IBUPROFEN 800 MG/1
800 TABLET, FILM COATED ORAL ONCE
Status: DISCONTINUED | OUTPATIENT
Start: 2025-08-07 | End: 2025-08-07 | Stop reason: HOSPADM

## 2025-08-07 RX ORDER — ACETAMINOPHEN 500 MG
1000 TABLET ORAL ONCE
Status: COMPLETED | OUTPATIENT
Start: 2025-08-07 | End: 2025-08-07

## 2025-08-07 RX ADMIN — ACETAMINOPHEN 1000 MG: 500 TABLET, FILM COATED ORAL at 19:24

## 2025-08-07 ASSESSMENT — ACTIVITIES OF DAILY LIVING (ADL)
ADLS_ACUITY_SCORE: 41
ADLS_ACUITY_SCORE: 41

## 2025-08-07 ASSESSMENT — COLUMBIA-SUICIDE SEVERITY RATING SCALE - C-SSRS
1. IN THE PAST MONTH, HAVE YOU WISHED YOU WERE DEAD OR WISHED YOU COULD GO TO SLEEP AND NOT WAKE UP?: NO
2. HAVE YOU ACTUALLY HAD ANY THOUGHTS OF KILLING YOURSELF IN THE PAST MONTH?: NO
6. HAVE YOU EVER DONE ANYTHING, STARTED TO DO ANYTHING, OR PREPARED TO DO ANYTHING TO END YOUR LIFE?: NO

## 2025-08-12 SDOH — HEALTH STABILITY: PHYSICAL HEALTH: ON AVERAGE, HOW MANY DAYS PER WEEK DO YOU ENGAGE IN MODERATE TO STRENUOUS EXERCISE (LIKE A BRISK WALK)?: 1 DAY

## 2025-08-12 SDOH — HEALTH STABILITY: PHYSICAL HEALTH: ON AVERAGE, HOW MANY MINUTES DO YOU ENGAGE IN EXERCISE AT THIS LEVEL?: 10 MIN

## 2025-08-12 ASSESSMENT — PATIENT HEALTH QUESTIONNAIRE - PHQ9
SUM OF ALL RESPONSES TO PHQ QUESTIONS 1-9: 2
SUM OF ALL RESPONSES TO PHQ QUESTIONS 1-9: 2
10. IF YOU CHECKED OFF ANY PROBLEMS, HOW DIFFICULT HAVE THESE PROBLEMS MADE IT FOR YOU TO DO YOUR WORK, TAKE CARE OF THINGS AT HOME, OR GET ALONG WITH OTHER PEOPLE: NOT DIFFICULT AT ALL

## 2025-08-12 ASSESSMENT — SOCIAL DETERMINANTS OF HEALTH (SDOH): HOW OFTEN DO YOU GET TOGETHER WITH FRIENDS OR RELATIVES?: ONCE A WEEK

## 2025-08-13 ENCOUNTER — OFFICE VISIT (OUTPATIENT)
Dept: FAMILY MEDICINE | Facility: CLINIC | Age: 40
End: 2025-08-13
Attending: GENERAL PRACTICE
Payer: COMMERCIAL

## 2025-08-13 VITALS
DIASTOLIC BLOOD PRESSURE: 78 MMHG | WEIGHT: 142.4 LBS | SYSTOLIC BLOOD PRESSURE: 114 MMHG | HEIGHT: 60 IN | OXYGEN SATURATION: 100 % | TEMPERATURE: 97.7 F | HEART RATE: 73 BPM | RESPIRATION RATE: 16 BRPM | BODY MASS INDEX: 27.96 KG/M2

## 2025-08-13 DIAGNOSIS — M54.2 NECK PAIN: ICD-10-CM

## 2025-08-13 DIAGNOSIS — Z00.00 ROUTINE GENERAL MEDICAL EXAMINATION AT A HEALTH CARE FACILITY: Primary | ICD-10-CM

## 2025-08-13 LAB
ALBUMIN SERPL BCG-MCNC: 4.4 G/DL (ref 3.5–5.2)
ALP SERPL-CCNC: 74 U/L (ref 40–150)
ALT SERPL W P-5'-P-CCNC: 18 U/L (ref 0–50)
ANION GAP SERPL CALCULATED.3IONS-SCNC: 10 MMOL/L (ref 7–15)
AST SERPL W P-5'-P-CCNC: 22 U/L (ref 0–45)
BILIRUB SERPL-MCNC: 0.2 MG/DL
BUN SERPL-MCNC: 22.3 MG/DL (ref 6–20)
CALCIUM SERPL-MCNC: 9.5 MG/DL (ref 8.8–10.4)
CHLORIDE SERPL-SCNC: 101 MMOL/L (ref 98–107)
CHOLEST SERPL-MCNC: 196 MG/DL
CREAT SERPL-MCNC: 1.01 MG/DL (ref 0.51–0.95)
EGFRCR SERPLBLD CKD-EPI 2021: 71 ML/MIN/1.73M2
FASTING STATUS PATIENT QL REPORTED: YES
FASTING STATUS PATIENT QL REPORTED: YES
GLUCOSE SERPL-MCNC: 87 MG/DL (ref 70–99)
HCO3 SERPL-SCNC: 25 MMOL/L (ref 22–29)
HDLC SERPL-MCNC: 71 MG/DL
LDLC SERPL CALC-MCNC: 107 MG/DL
MEV IGG SER IA-ACNC: 85.1 AU/ML
MEV IGG SER IA-ACNC: POSITIVE
MUMPS ANTIBODY IGG INSTRUMENT VALUE: >300 AU/ML
MUV IGG SER QL IA: POSITIVE
NONHDLC SERPL-MCNC: 125 MG/DL
POTASSIUM SERPL-SCNC: 4.1 MMOL/L (ref 3.4–5.3)
PROT SERPL-MCNC: 7.5 G/DL (ref 6.4–8.3)
RUBV IGG SERPL QL IA: 3.69 INDEX
RUBV IGG SERPL QL IA: POSITIVE
SODIUM SERPL-SCNC: 136 MMOL/L (ref 135–145)
TRIGL SERPL-MCNC: 91 MG/DL

## 2025-08-13 PROCEDURE — 86317 IMMUNOASSAY INFECTIOUS AGENT: CPT | Mod: 90 | Performed by: GENERAL PRACTICE

## 2025-08-13 PROCEDURE — 90472 IMMUNIZATION ADMIN EACH ADD: CPT | Performed by: GENERAL PRACTICE

## 2025-08-13 PROCEDURE — 90715 TDAP VACCINE 7 YRS/> IM: CPT | Performed by: GENERAL PRACTICE

## 2025-08-13 PROCEDURE — 80053 COMPREHEN METABOLIC PANEL: CPT | Performed by: GENERAL PRACTICE

## 2025-08-13 PROCEDURE — 86765 RUBEOLA ANTIBODY: CPT | Performed by: GENERAL PRACTICE

## 2025-08-13 PROCEDURE — 1125F AMNT PAIN NOTED PAIN PRSNT: CPT | Performed by: GENERAL PRACTICE

## 2025-08-13 PROCEDURE — 86762 RUBELLA ANTIBODY: CPT | Performed by: GENERAL PRACTICE

## 2025-08-13 PROCEDURE — 90480 ADMN SARSCOV2 VAC 1/ONLY CMP: CPT | Performed by: GENERAL PRACTICE

## 2025-08-13 PROCEDURE — 90746 HEPB VACCINE 3 DOSE ADULT IM: CPT | Performed by: GENERAL PRACTICE

## 2025-08-13 PROCEDURE — 3078F DIAST BP <80 MM HG: CPT | Performed by: GENERAL PRACTICE

## 2025-08-13 PROCEDURE — 80061 LIPID PANEL: CPT | Performed by: GENERAL PRACTICE

## 2025-08-13 PROCEDURE — 90713 POLIOVIRUS IPV SC/IM: CPT | Performed by: GENERAL PRACTICE

## 2025-08-13 PROCEDURE — 86735 MUMPS ANTIBODY: CPT | Performed by: GENERAL PRACTICE

## 2025-08-13 PROCEDURE — 99396 PREV VISIT EST AGE 40-64: CPT | Mod: 25 | Performed by: GENERAL PRACTICE

## 2025-08-13 PROCEDURE — 3074F SYST BP LT 130 MM HG: CPT | Performed by: GENERAL PRACTICE

## 2025-08-13 PROCEDURE — 90471 IMMUNIZATION ADMIN: CPT | Performed by: GENERAL PRACTICE

## 2025-08-13 PROCEDURE — 99000 SPECIMEN HANDLING OFFICE-LAB: CPT | Performed by: GENERAL PRACTICE

## 2025-08-13 PROCEDURE — 36415 COLL VENOUS BLD VENIPUNCTURE: CPT | Performed by: GENERAL PRACTICE

## 2025-08-13 PROCEDURE — 91320 SARSCV2 VAC 30MCG TRS-SUC IM: CPT | Performed by: GENERAL PRACTICE

## 2025-08-13 ASSESSMENT — PAIN SCALES - GENERAL: PAINLEVEL_OUTOF10: MODERATE PAIN (4)
